# Patient Record
Sex: FEMALE | Race: BLACK OR AFRICAN AMERICAN | NOT HISPANIC OR LATINO | Employment: OTHER | URBAN - METROPOLITAN AREA
[De-identification: names, ages, dates, MRNs, and addresses within clinical notes are randomized per-mention and may not be internally consistent; named-entity substitution may affect disease eponyms.]

---

## 2023-04-25 PROBLEM — R09.89 LABILE HYPERTENSION: Status: ACTIVE | Noted: 2023-04-25

## 2023-04-25 PROBLEM — J45.909 ASTHMA (HHS-HCC): Status: ACTIVE | Noted: 2023-04-25

## 2023-04-26 ENCOUNTER — OFFICE VISIT (OUTPATIENT)
Dept: PRIMARY CARE | Facility: CLINIC | Age: 68
End: 2023-04-26
Payer: MEDICARE

## 2023-04-26 VITALS
TEMPERATURE: 97.9 F | HEART RATE: 89 BPM | DIASTOLIC BLOOD PRESSURE: 67 MMHG | SYSTOLIC BLOOD PRESSURE: 112 MMHG | WEIGHT: 293 LBS | BODY MASS INDEX: 44.3 KG/M2

## 2023-04-26 DIAGNOSIS — E55.9 VITAMIN D DEFICIENCY: ICD-10-CM

## 2023-04-26 DIAGNOSIS — Z12.39 BREAST SCREENING: ICD-10-CM

## 2023-04-26 DIAGNOSIS — I10 ESSENTIAL HYPERTENSION: ICD-10-CM

## 2023-04-26 DIAGNOSIS — J45.20 MILD INTERMITTENT ASTHMA WITHOUT COMPLICATION (HHS-HCC): ICD-10-CM

## 2023-04-26 DIAGNOSIS — E66.01 OBESITY, MORBID (MULTI): ICD-10-CM

## 2023-04-26 DIAGNOSIS — R73.03 PREDIABETES: ICD-10-CM

## 2023-04-26 DIAGNOSIS — R09.89 LABILE HYPERTENSION: Primary | ICD-10-CM

## 2023-04-26 DIAGNOSIS — Z12.31 BREAST CANCER SCREENING BY MAMMOGRAM: ICD-10-CM

## 2023-04-26 PROCEDURE — 1036F TOBACCO NON-USER: CPT | Performed by: INTERNAL MEDICINE

## 2023-04-26 PROCEDURE — 1159F MED LIST DOCD IN RCRD: CPT | Performed by: INTERNAL MEDICINE

## 2023-04-26 PROCEDURE — 99214 OFFICE O/P EST MOD 30 MIN: CPT | Performed by: INTERNAL MEDICINE

## 2023-04-26 PROCEDURE — 1160F RVW MEDS BY RX/DR IN RCRD: CPT | Performed by: INTERNAL MEDICINE

## 2023-04-26 PROCEDURE — 3078F DIAST BP <80 MM HG: CPT | Performed by: INTERNAL MEDICINE

## 2023-04-26 PROCEDURE — 3074F SYST BP LT 130 MM HG: CPT | Performed by: INTERNAL MEDICINE

## 2023-04-26 RX ORDER — ACETAMINOPHEN 500 MG
1 TABLET ORAL DAILY
COMMUNITY
Start: 2013-12-03

## 2023-04-26 RX ORDER — NICOTINE POLACRILEX 2 MG
GUM BUCCAL
COMMUNITY

## 2023-04-26 RX ORDER — BECLOMETHASONE DIPROPIONATE HFA 80 UG/1
80 AEROSOL, METERED RESPIRATORY (INHALATION)
COMMUNITY
Start: 2021-02-11 | End: 2023-10-05

## 2023-04-26 RX ORDER — ALBUTEROL SULFATE 90 UG/1
1 AEROSOL, METERED RESPIRATORY (INHALATION) EVERY 4 HOURS PRN
COMMUNITY
Start: 2013-05-15 | End: 2023-10-05

## 2023-04-26 RX ORDER — CHLORTHALIDONE 25 MG/1
1 TABLET ORAL DAILY
COMMUNITY
Start: 2014-12-29 | End: 2024-01-09 | Stop reason: SDUPTHER

## 2023-04-26 RX ORDER — BUDESONIDE AND FORMOTEROL FUMARATE DIHYDRATE 80; 4.5 UG/1; UG/1
2 AEROSOL RESPIRATORY (INHALATION)
Qty: 1 EACH | Refills: 2 | Status: SHIPPED | OUTPATIENT
Start: 2023-04-26 | End: 2023-04-28 | Stop reason: SDUPTHER

## 2023-04-26 NOTE — PROGRESS NOTES
Subjective     Patient ID: Latanya Wade is a 67 y.o. female who presents for Establish Care.  HPI  67F past pt of Dr. Dawkins here for establishment of care. She was last seen by Dr. Dawkins in January of last year for preventive care visit.     - Gained 12 pounds over the last month or so, has had significant success with weight loss in the past.     PMHx:  - HTN on chlorthalidone 25mg has missed a few doses this week, checks home BAP readings typically ranging 120-130/70s.   - Asthma - mild intermittent triggered by an allergen such as dust. No nighttime symptoms no hospitalizations.   - Breast biopsy 2010 with atypical cells elected not to pursue tamoxifen treatment.     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 Has nieces. Moved in November.    Lifestyle   - Exercises regularly, youtube, line dances, walks, resistance with weights.     ROS:  Review of systems as stated above, otherwise unremarkable.        Objective   Vitals:    04/26/23 1201 04/26/23 1230   BP: 151/71 112/67   Pulse: 89    Temp: 36.6 °C (97.9 °F)    TempSrc: Temporal    Weight: 136 kg (300 lb)           Physical Exam  General: Alert and oriented, in no apparent distress   HEENT: No conjunctival erythema, no external facial lesions   Lungs: Breathing comfortably  Skin: No evidence of skin breakdown.  Neuro: AAO x 3, answering questions appropriately, no obvious cranial nerve deficits    Assessment/Plan   Problem List Items Addressed This Visit          Respiratory    Mild intermittent asthma without complication     Well controlled, unclear indication for qvar given that it has always been mild intermittent. Will stop qvar, advised symbicort prn for now.   Notify if symptoms worsen.          Relevant Medications    budesonide-formoteroL (Symbicort) 80-4.5 mcg/actuation inhaler       Circulatory    Labile hypertension -  Primary     Repeat measurement well controlled on chlorthalidone without side effects.   Continue lifestyle modifications.         Relevant Orders    CBC and Auto Differential    Comprehensive Metabolic Panel    TSH with reflex to Free T4 if abnormal    Lipid Panel    Vitamin D 25-Hydroxy,Total       Endocrine/Metabolic    Obesity, morbid (CMS/HCC)     With difficulty losing weight despite attempts at lifestyle modifications. Exercises regularly as well.  Weight related comorbidities including hypertension, will further risk stratify with repeat blodowork and discuss in greater detail at next visit.          Other Visit Diagnoses       Breast screening        Relevant Orders    BI mammo bilateral screening tomosynthesis    Prediabetes        Reviewed lifestyle modifications, remotely detected. Dieudonne repeat    Relevant Orders    Hemoglobin A1C    Breast cancer screening by mammogram        Relevant Orders    BI mammo bilateral screening tomosynthesis    Essential hypertension        Relevant Orders    Lipid Panel    Follow Up In [facility] - PCP    Vitamin D deficiency        Relevant Orders    Vitamin D 25-Hydroxy,Total            Health Maintenance   Cancer screen  - Colonoscopy 2/22 repeat 5 years   - Mammogram 10/21 due for repeat   - Pap + HPV UTD 2019.  - Skin no indications at present.  Immunizations:   - UTD flu, shingrix, declines Prevnar, due for covid booster.   Cataracts - due for followup eye exam   Due for bone density test

## 2023-04-27 NOTE — ASSESSMENT & PLAN NOTE
Repeat measurement well controlled on chlorthalidone without side effects.   Continue lifestyle modifications.

## 2023-04-27 NOTE — ASSESSMENT & PLAN NOTE
Well controlled, unclear indication for qvar given that it has always been mild intermittent. Will stop qvar, advised symbicort prn for now.   Notify if symptoms worsen.

## 2023-04-27 NOTE — ASSESSMENT & PLAN NOTE
With difficulty losing weight despite attempts at lifestyle modifications. Exercises regularly as well.  Weight related comorbidities including hypertension, will further risk stratify with repeat blodowork and discuss in greater detail at next visit.

## 2023-04-28 DIAGNOSIS — J45.20 MILD INTERMITTENT ASTHMA WITHOUT COMPLICATION (HHS-HCC): ICD-10-CM

## 2023-04-28 RX ORDER — BUDESONIDE AND FORMOTEROL FUMARATE DIHYDRATE 80; 4.5 UG/1; UG/1
2 AEROSOL RESPIRATORY (INHALATION)
Qty: 1 EACH | Refills: 2 | Status: SHIPPED | OUTPATIENT
Start: 2023-04-28 | End: 2024-04-27

## 2023-05-03 RX ORDER — BECLOMETHASONE DIPROPIONATE HFA 80 UG/1
80 AEROSOL, METERED RESPIRATORY (INHALATION)
Qty: 3 G | Refills: 1 | OUTPATIENT
Start: 2023-05-03

## 2023-07-08 ENCOUNTER — LAB (OUTPATIENT)
Dept: LAB | Facility: LAB | Age: 68
End: 2023-07-08
Payer: MEDICARE

## 2023-07-08 DIAGNOSIS — I10 ESSENTIAL HYPERTENSION: ICD-10-CM

## 2023-07-08 DIAGNOSIS — E55.9 VITAMIN D DEFICIENCY: ICD-10-CM

## 2023-07-08 DIAGNOSIS — R73.03 PREDIABETES: ICD-10-CM

## 2023-07-08 DIAGNOSIS — R09.89 LABILE HYPERTENSION: ICD-10-CM

## 2023-07-08 LAB
ALANINE AMINOTRANSFERASE (SGPT) (U/L) IN SER/PLAS: 5 U/L (ref 7–45)
ALBUMIN (G/DL) IN SER/PLAS: 3.9 G/DL (ref 3.4–5)
ALKALINE PHOSPHATASE (U/L) IN SER/PLAS: 87 U/L (ref 33–136)
ANION GAP IN SER/PLAS: 10 MMOL/L (ref 10–20)
ASPARTATE AMINOTRANSFERASE (SGOT) (U/L) IN SER/PLAS: 14 U/L (ref 9–39)
BASOPHILS (10*3/UL) IN BLOOD BY AUTOMATED COUNT: 0.05 X10E9/L (ref 0–0.1)
BASOPHILS/100 LEUKOCYTES IN BLOOD BY AUTOMATED COUNT: 1.1 % (ref 0–2)
BILIRUBIN TOTAL (MG/DL) IN SER/PLAS: 0.7 MG/DL (ref 0–1.2)
CALCIDIOL (25 OH VITAMIN D3) (NG/ML) IN SER/PLAS: 30 NG/ML
CALCIUM (MG/DL) IN SER/PLAS: 9.5 MG/DL (ref 8.6–10.6)
CARBON DIOXIDE, TOTAL (MMOL/L) IN SER/PLAS: 33 MMOL/L (ref 21–32)
CHLORIDE (MMOL/L) IN SER/PLAS: 103 MMOL/L (ref 98–107)
CHOLESTEROL (MG/DL) IN SER/PLAS: 171 MG/DL (ref 0–199)
CHOLESTEROL IN HDL (MG/DL) IN SER/PLAS: 55.6 MG/DL
CHOLESTEROL/HDL RATIO: 3.1
CREATININE (MG/DL) IN SER/PLAS: 0.75 MG/DL (ref 0.5–1.05)
EOSINOPHILS (10*3/UL) IN BLOOD BY AUTOMATED COUNT: 0.18 X10E9/L (ref 0–0.7)
EOSINOPHILS/100 LEUKOCYTES IN BLOOD BY AUTOMATED COUNT: 4 % (ref 0–6)
ERYTHROCYTE DISTRIBUTION WIDTH (RATIO) BY AUTOMATED COUNT: 18 % (ref 11.5–14.5)
ERYTHROCYTE MEAN CORPUSCULAR HEMOGLOBIN CONCENTRATION (G/DL) BY AUTOMATED: 29.9 G/DL (ref 32–36)
ERYTHROCYTE MEAN CORPUSCULAR VOLUME (FL) BY AUTOMATED COUNT: 77 FL (ref 80–100)
ERYTHROCYTES (10*6/UL) IN BLOOD BY AUTOMATED COUNT: 5.72 X10E12/L (ref 4–5.2)
ESTIMATED AVERAGE GLUCOSE FOR HBA1C: 128 MG/DL
GFR FEMALE: 88 ML/MIN/1.73M2
GLUCOSE (MG/DL) IN SER/PLAS: 94 MG/DL (ref 74–99)
HEMATOCRIT (%) IN BLOOD BY AUTOMATED COUNT: 43.8 % (ref 36–46)
HEMOGLOBIN (G/DL) IN BLOOD: 13.1 G/DL (ref 12–16)
HEMOGLOBIN A1C/HEMOGLOBIN TOTAL IN BLOOD: 6.1 %
IMMATURE GRANULOCYTES/100 LEUKOCYTES IN BLOOD BY AUTOMATED COUNT: 0.2 % (ref 0–0.9)
LDL: 101 MG/DL (ref 0–99)
LEUKOCYTES (10*3/UL) IN BLOOD BY AUTOMATED COUNT: 4.5 X10E9/L (ref 4.4–11.3)
LYMPHOCYTES (10*3/UL) IN BLOOD BY AUTOMATED COUNT: 1.5 X10E9/L (ref 1.2–4.8)
LYMPHOCYTES/100 LEUKOCYTES IN BLOOD BY AUTOMATED COUNT: 33.2 % (ref 13–44)
MONOCYTES (10*3/UL) IN BLOOD BY AUTOMATED COUNT: 0.61 X10E9/L (ref 0.1–1)
MONOCYTES/100 LEUKOCYTES IN BLOOD BY AUTOMATED COUNT: 13.5 % (ref 2–10)
NEUTROPHILS (10*3/UL) IN BLOOD BY AUTOMATED COUNT: 2.17 X10E9/L (ref 1.2–7.7)
NEUTROPHILS/100 LEUKOCYTES IN BLOOD BY AUTOMATED COUNT: 48 % (ref 40–80)
NRBC (PER 100 WBCS) BY AUTOMATED COUNT: 0 /100 WBC (ref 0–0)
PLATELETS (10*3/UL) IN BLOOD AUTOMATED COUNT: 265 X10E9/L (ref 150–450)
POTASSIUM (MMOL/L) IN SER/PLAS: 3.8 MMOL/L (ref 3.5–5.3)
PROTEIN TOTAL: 7.4 G/DL (ref 6.4–8.2)
SODIUM (MMOL/L) IN SER/PLAS: 142 MMOL/L (ref 136–145)
THYROTROPIN (MIU/L) IN SER/PLAS BY DETECTION LIMIT <= 0.05 MIU/L: 0.72 MIU/L (ref 0.44–3.98)
TRIGLYCERIDE (MG/DL) IN SER/PLAS: 72 MG/DL (ref 0–149)
UREA NITROGEN (MG/DL) IN SER/PLAS: 19 MG/DL (ref 6–23)
VLDL: 14 MG/DL (ref 0–40)

## 2023-07-08 PROCEDURE — 83036 HEMOGLOBIN GLYCOSYLATED A1C: CPT

## 2023-07-08 PROCEDURE — 36415 COLL VENOUS BLD VENIPUNCTURE: CPT

## 2023-07-08 PROCEDURE — 82306 VITAMIN D 25 HYDROXY: CPT

## 2023-07-08 PROCEDURE — 85025 COMPLETE CBC W/AUTO DIFF WBC: CPT

## 2023-07-08 PROCEDURE — 80053 COMPREHEN METABOLIC PANEL: CPT

## 2023-07-08 PROCEDURE — 80061 LIPID PANEL: CPT

## 2023-07-08 PROCEDURE — 84443 ASSAY THYROID STIM HORMONE: CPT

## 2023-07-13 ENCOUNTER — OFFICE VISIT (OUTPATIENT)
Dept: PRIMARY CARE | Facility: CLINIC | Age: 68
End: 2023-07-13
Payer: MEDICARE

## 2023-07-13 VITALS
BODY MASS INDEX: 45.35 KG/M2 | HEART RATE: 81 BPM | WEIGHT: 293 LBS | TEMPERATURE: 98.1 F | SYSTOLIC BLOOD PRESSURE: 147 MMHG | DIASTOLIC BLOOD PRESSURE: 71 MMHG

## 2023-07-13 DIAGNOSIS — Z78.0 ASYMPTOMATIC MENOPAUSAL STATE: Primary | ICD-10-CM

## 2023-07-13 DIAGNOSIS — R73.03 PREDIABETES: ICD-10-CM

## 2023-07-13 DIAGNOSIS — H26.9 CATARACT OF BOTH EYES, UNSPECIFIED CATARACT TYPE: ICD-10-CM

## 2023-07-13 DIAGNOSIS — R09.89 LABILE HYPERTENSION: ICD-10-CM

## 2023-07-13 DIAGNOSIS — I10 ESSENTIAL HYPERTENSION: ICD-10-CM

## 2023-07-13 DIAGNOSIS — B35.3 TINEA PEDIS OF BOTH FEET: ICD-10-CM

## 2023-07-13 DIAGNOSIS — Z00.00 ROUTINE GENERAL MEDICAL EXAMINATION AT HEALTH CARE FACILITY: ICD-10-CM

## 2023-07-13 DIAGNOSIS — E66.01 OBESITY, MORBID (MULTI): ICD-10-CM

## 2023-07-13 PROBLEM — M54.31 SCIATICA OF RIGHT SIDE: Status: ACTIVE | Noted: 2023-07-13

## 2023-07-13 PROCEDURE — 1170F FXNL STATUS ASSESSED: CPT | Performed by: INTERNAL MEDICINE

## 2023-07-13 PROCEDURE — 3077F SYST BP >= 140 MM HG: CPT | Performed by: INTERNAL MEDICINE

## 2023-07-13 PROCEDURE — 3078F DIAST BP <80 MM HG: CPT | Performed by: INTERNAL MEDICINE

## 2023-07-13 PROCEDURE — 99214 OFFICE O/P EST MOD 30 MIN: CPT | Performed by: INTERNAL MEDICINE

## 2023-07-13 PROCEDURE — 1036F TOBACCO NON-USER: CPT | Performed by: INTERNAL MEDICINE

## 2023-07-13 PROCEDURE — G0439 PPPS, SUBSEQ VISIT: HCPCS | Performed by: INTERNAL MEDICINE

## 2023-07-13 PROCEDURE — 1126F AMNT PAIN NOTED NONE PRSNT: CPT | Performed by: INTERNAL MEDICINE

## 2023-07-13 PROCEDURE — 1160F RVW MEDS BY RX/DR IN RCRD: CPT | Performed by: INTERNAL MEDICINE

## 2023-07-13 PROCEDURE — 1159F MED LIST DOCD IN RCRD: CPT | Performed by: INTERNAL MEDICINE

## 2023-07-13 RX ORDER — CLOTRIMAZOLE 1 %
CREAM (GRAM) TOPICAL 2 TIMES DAILY
Qty: 30 G | Refills: 0 | Status: SHIPPED | OUTPATIENT
Start: 2023-07-13 | End: 2023-08-12

## 2023-07-13 ASSESSMENT — PATIENT HEALTH QUESTIONNAIRE - PHQ9
1. LITTLE INTEREST OR PLEASURE IN DOING THINGS: NOT AT ALL
SUM OF ALL RESPONSES TO PHQ9 QUESTIONS 1 & 2: 0
2. FEELING DOWN, DEPRESSED OR HOPELESS: NOT AT ALL
2. FEELING DOWN, DEPRESSED OR HOPELESS: NOT AT ALL
1. LITTLE INTEREST OR PLEASURE IN DOING THINGS: NOT AT ALL
SUM OF ALL RESPONSES TO PHQ9 QUESTIONS 1 AND 2: 0

## 2023-07-13 ASSESSMENT — ACTIVITIES OF DAILY LIVING (ADL)
GROCERY_SHOPPING: INDEPENDENT
MANAGING_FINANCES: INDEPENDENT
DRESSING: INDEPENDENT
BATHING: INDEPENDENT
DOING_HOUSEWORK: INDEPENDENT
TAKING_MEDICATION: INDEPENDENT

## 2023-07-13 NOTE — ASSESSMENT & PLAN NOTE
Has had success with lifestyle modifications, not interested in additional medical management strategies. Encouragement provided.

## 2023-07-13 NOTE — PROGRESS NOTES
Subjective     Patient ID: Latanya Wade is a 68 y.o. female who presents for No chief complaint on file., annual wellness visit and followup of chronic conditions.   HPI  68F here for AWV and followup of chronic conditions, last seen in April for establishment of care. This is her first AWV, though has been over a year since starting Medicare.     6/23: mammo good.   7/23: labs done prior notable for prediabetes.     - Numbness on plantar surface of right toe started last August no clear precipitant. Still has sensation but reduced as compared with the other side. Etiology as of yet undetermined. Has had EMG for this in the past, no weakness.     PMHx:  - HTN on chlorthalidone 25mg did not take her pill yesterday, home BP readings 120s/70s.   - Asthma - mild intermittent triggered by an allergen such as dust. No nighttime symptoms no hospitalizations, stopped qvar at last visit for symbicort prn. No issues since.   - Breast biopsy 2010 with atypical cells elected not to pursue tamoxifen treatment.   - Class III obesity - difficulty losing weight interested in further management, not interested in medical management.   - Prediabetes most recent A1C 6.1%   - Sciatica and intermittent stiffness in knees and hips - does flexibility exercises, no alarm symptoms.   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 Has nieces. Moved in November.    Lifestyle - started at 381 now down to 307, has a history of losing 100 pounds.   - Diet - has good and bad days, not planning and not having food in the house are triggers for worsening diet, knows what to do.   - Exercises regularly, youtube, line dances, walks, resistance with weights.   - Sleep  - no trouble falling asleep, wakes up a few times a night and then falls asleep again. Side sleeping results in numbness in  fingers.  Does not know if she stops breathing or snoring. Not overly tired the next day, Gets 6 hours a night and feels refreshed. Mind is on overdrive.      No care team member to display      Review of Systems:   General: No constitutional symptoms, significant changes in weight  HEENT: No headaches, changes in vision or hearing, no sinus or dental issues   Cardiac: No chest pain, palpitations, dyspnea on exertion   Lungs: No cough, wheezing, shortness of breath   Abdomen: No abdominal pain, nausea/vomiting, diarrhea or constipation   : No urinary complaints   Musculoskeletal: as described   Heme: No bleeding or thrombosis issues   Lymph: No swollen lymph glands   Skin: No rashes or lesions   Psych: No reported anxiety or depression     Objective       Current Outpatient Medications:     albuterol 90 mcg/actuation inhaler, Inhale 1 puff every 4 hours if needed., Disp: , Rfl:     biotin 1 mg capsule, Take by mouth., Disp: , Rfl:     budesonide-formoteroL (Symbicort) 80-4.5 mcg/actuation inhaler, Inhale 2 puffs 2 times a day. Rinse mouth with water after use to reduce aftertaste and incidence of candidiasis. Do not swallow., Disp: 1 each, Rfl: 2    CALCIUM CITRATE ORAL, Take 1 tablet by mouth once daily., Disp: , Rfl:     chlorthalidone (Hygroton) 25 mg tablet, Take 1 tablet (25 mg) by mouth once daily., Disp: , Rfl:     cholecalciferol (Vitamin D-3) 50 mcg (2,000 unit) capsule, Take 1 capsule (50 mcg) by mouth once daily., Disp: , Rfl:     clotrimazole (Lotrimin) 1 % cream, Apply topically 2 times a day. apply to affected area, Disp: 30 g, Rfl: 0    Qvar RediHaler 80 mcg/actuation inhaler, Inhale 1 Inhalation 2 times a day., Disp: , Rfl:       /71   Pulse 81   Temp 36.7 °C (98.1 °F)   Wt 139 kg (307 lb 2 oz)   BMI 45.35 kg/m²       Physical Examination:   General: Awake, alert, appears stated age   Head/eyes/ears: NCAT, EOMI, PERRL, TM WNL, no cerumen  Throat: moist mucus membranes, no pharyngeal  erythema  Neck: Supple, nontender, no lymphadenopathy, thyroid exam unremarkable   Breast exam: No palpable lumps, no discharge, no noted axillary lymphadenopathy. Chaperone offered and declined  Heart: RRR, no murmurs, rubs or gallops  Lungs: CTA bilaterally, no rhonchi rales or wheezes   Abdomen: Soft, NT/ND  Extremities: No edema, 2+ DP pulses   Skin: Tinea pedis appreciated   Neuro: AAO x 3, no FND, gait unremarkable    Assessment/Plan   Problem List Items Addressed This Visit       Labile hypertension     Regular home blood pressure readings remain wnl, elevated today. Will recheck at next visit.          Obesity, morbid (CMS/HCC)     Has had success with lifestyle modifications, not interested in additional medical management strategies. Encouragement provided.          Prediabetes     Lifestyle modifications reviewed.          Cataracts, bilateral     Pending surgery with optho in August.          Other Visit Diagnoses       Asymptomatic menopausal state    -  Primary    Relevant Orders    XR DEXA bone density    Essential hypertension        Relevant Orders    Referral to Physical Therapy    Follow Up In [facility] - PCP - Established    Tinea pedis of both feet        Relevant Medications    clotrimazole (Lotrimin) 1 % cream    Routine general medical examination at health care facility            Sleep maintenance insomnia   Unclear etiology, lower index of suspicion for sleep apnea given that she does feel rested and no profound fatigue throughout the day. Will continue to work on sleep hygiene, advised trial of magnesium and melatonin prior to bed. Will reassess at next visit.     Toe numbness  Workup including EMG performed, has been discussed consideration for MRI to explore lumbar etiology, though precontemplative, followup with Dr. Ashraf.     Adult Health Examination  Age appropriate screening performed   Healthy lifestyle reviewed.   Depression screen   No additional pertinent family  history or toxic habits   No high risk sexual behavior,   Cancer screening:   - Colonoscopy 2/22 repeat 5 years   - Mammogram 4/23   - Pap smear 2019 upcoming appt scheduled with Dr. Thomas  until September   - Skin cancer prevention strategies reviewed   Immunizations Flu shot recommended in October, recommend Prevnar and covid booster   Dental and visual examinations   Discussed adequate Vitamin D intake   DEXA due and ordered

## 2023-07-13 NOTE — PATIENT INSTRUCTIONS
It was a pleasure to see you today! Here is a list of things we have discussed and to follow up on:    Try magnesium threonate or glycinate 400mg 30 minutes before bed. If you use melatonin no more than 1-3mg MAX.   Followup with Dr. Ashraf regarding the numbness.   Consider establishing a living will and healthcare power of .   Tdap vaccination at the pharmacy, call to schedule your pneumonia shot   COVID bivalent booster at the pharmacy.   Bone density test - please call [phone] or stop by the radiology department on the 1st floor to have this scheduled.   Lotrimin between the toes.   Followup with me in 6 months or as needed

## 2023-08-28 PROBLEM — G57.10 MERALGIA PARAESTHETICA: Status: ACTIVE | Noted: 2023-08-28

## 2023-08-28 PROBLEM — J45.909 ASTHMA (HHS-HCC): Status: ACTIVE | Noted: 2023-08-28

## 2023-08-28 PROBLEM — I87.323 CHRONIC VENOUS HYPERTENSION WITH INFLAMMATION INVOLVING BOTH SIDES: Status: ACTIVE | Noted: 2023-08-28

## 2023-08-28 PROBLEM — E55.9 VITAMIN D DEFICIENCY: Status: ACTIVE | Noted: 2023-08-28

## 2023-08-28 PROBLEM — R29.2 HYPERREFLEXIA: Status: ACTIVE | Noted: 2023-08-28

## 2023-08-28 PROBLEM — R20.0 NUMBNESS OF RIGHT FOOT: Status: ACTIVE | Noted: 2023-08-28

## 2023-08-28 PROBLEM — M19.012 ARTHRITIS OF LEFT SHOULDER REGION: Status: ACTIVE | Noted: 2023-08-28

## 2023-08-28 PROBLEM — R20.0 NUMBNESS OF TOES: Status: ACTIVE | Noted: 2023-08-28

## 2023-08-28 PROBLEM — M54.17 LUMBOSACRAL RADICULOPATHY AT S1: Status: ACTIVE | Noted: 2023-08-28

## 2023-08-28 RX ORDER — PREDNISOLONE ACETATE 10 MG/ML
SUSPENSION/ DROPS OPHTHALMIC
COMMUNITY
Start: 2023-06-27

## 2023-08-28 RX ORDER — FLAXSEED OIL 1000 MG
CAPSULE ORAL
COMMUNITY
Start: 2013-12-03

## 2023-08-28 RX ORDER — MOXIFLOXACIN 5 MG/ML
SOLUTION/ DROPS OPHTHALMIC
COMMUNITY
Start: 2023-06-27

## 2023-08-28 RX ORDER — IBUPROFEN 100 MG/5ML
1 SUSPENSION, ORAL (FINAL DOSE FORM) ORAL DAILY
COMMUNITY
Start: 2020-10-13

## 2023-08-28 RX ORDER — BROMFENAC SODIUM 0.7 MG/ML
SOLUTION/ DROPS OPHTHALMIC
COMMUNITY
Start: 2023-06-27

## 2023-08-28 RX ORDER — SODIUM PICOSULFATE, MAGNESIUM OXIDE, AND ANHYDROUS CITRIC ACID 10; 3.5; 12 MG/160ML; G/160ML; G/160ML
LIQUID ORAL
COMMUNITY
Start: 2022-02-07

## 2023-10-05 ENCOUNTER — OFFICE VISIT (OUTPATIENT)
Dept: OBSTETRICS AND GYNECOLOGY | Facility: CLINIC | Age: 68
End: 2023-10-05
Payer: MEDICARE

## 2023-10-05 VITALS
DIASTOLIC BLOOD PRESSURE: 62 MMHG | HEIGHT: 69 IN | WEIGHT: 293 LBS | BODY MASS INDEX: 43.4 KG/M2 | SYSTOLIC BLOOD PRESSURE: 140 MMHG

## 2023-10-05 DIAGNOSIS — Z12.4 CERVICAL CANCER SCREENING: ICD-10-CM

## 2023-10-05 DIAGNOSIS — Z01.419 ENCOUNTER FOR WELL WOMAN EXAM: Primary | ICD-10-CM

## 2023-10-05 PROCEDURE — 1159F MED LIST DOCD IN RCRD: CPT | Performed by: OBSTETRICS & GYNECOLOGY

## 2023-10-05 PROCEDURE — 1036F TOBACCO NON-USER: CPT | Performed by: OBSTETRICS & GYNECOLOGY

## 2023-10-05 PROCEDURE — 1126F AMNT PAIN NOTED NONE PRSNT: CPT | Performed by: OBSTETRICS & GYNECOLOGY

## 2023-10-05 PROCEDURE — 99397 PER PM REEVAL EST PAT 65+ YR: CPT | Performed by: OBSTETRICS & GYNECOLOGY

## 2023-10-05 PROCEDURE — 3077F SYST BP >= 140 MM HG: CPT | Performed by: OBSTETRICS & GYNECOLOGY

## 2023-10-05 PROCEDURE — 1160F RVW MEDS BY RX/DR IN RCRD: CPT | Performed by: OBSTETRICS & GYNECOLOGY

## 2023-10-05 PROCEDURE — 3078F DIAST BP <80 MM HG: CPT | Performed by: OBSTETRICS & GYNECOLOGY

## 2023-10-05 ASSESSMENT — PAIN SCALES - GENERAL: PAINLEVEL: 0-NO PAIN

## 2023-10-05 NOTE — PROGRESS NOTES
"Latanya Wade is a 68 y.o. No obstetric history on file. here for well woman exam.    HPI:   Concerns: no GYN concerns   Exercise: regular - walking, line dancing       GynHx:  Cycles: menopausal   Sexually active: not currently   Contraception: n/a   No LMP recorded (lmp unknown).  / menopause      OB History    No obstetric history on file.       Past med hx and past surg hx reviewed and notable for: cataract surgery     Objective   /62   Ht 1.753 m (5' 9\")   Wt 140 kg (308 lb)   LMP  (LMP Unknown) Comment: over 10 years ago  BMI 45.48 kg/m²     Physical Exam  Constitutional:       Appearance: Normal appearance.   HENT:      Head: Normocephalic and atraumatic.   Chest:   Breasts:     Right: Normal.      Left: Normal.   Abdominal:      General: Abdomen is flat.      Palpations: Abdomen is soft.      Tenderness: There is no abdominal tenderness.   Genitourinary:     General: Normal vulva.      Vagina: Normal.      Cervix: Normal.      Uterus: Normal.       Adnexa: Right adnexa normal and left adnexa normal.   Skin:     General: Skin is warm and dry.   Neurological:      Mental Status: She is alert and oriented to person, place, and time.   Psychiatric:         Mood and Affect: Mood normal.          Assessment and Plan:  Routine Well Woman Exam Today.   Discussed diet and exercise and routine health screening.   Pap: pap done today, last pap 2019 wnl            "

## 2023-10-06 PROCEDURE — 88175 CYTOPATH C/V AUTO FLUID REDO: CPT

## 2023-10-06 PROCEDURE — 87624 HPV HI-RISK TYP POOLED RSLT: CPT

## 2023-10-10 ENCOUNTER — TREATMENT (OUTPATIENT)
Dept: PHYSICAL THERAPY | Facility: CLINIC | Age: 68
End: 2023-10-10
Payer: MEDICARE

## 2023-10-10 DIAGNOSIS — M54.31 SCIATICA OF RIGHT SIDE: Primary | ICD-10-CM

## 2023-10-10 PROCEDURE — 97110 THERAPEUTIC EXERCISES: CPT | Mod: GP

## 2023-10-10 NOTE — PROGRESS NOTES
"Physical Therapy    Physical Therapy Treatment    Patient Name: Latanya Wade  MRN: [id number]  Today's Date: 10/10/2023  Time Calculation  Start Time: 1120  Stop Time: 1200  Time Calculation (min): 40 min  Visit: 3      Assessment:  PT Assessment  Assessment Comment: Scottie responded well to stretching and core / hip strenghtening well this sessoin. Continue to progress as tolerated.    Plan:  OP PT Plan  Treatment/Interventions: Cryotherapy, Dry needling, Education/ Instruction, Electrical stimulation, Hot pack, Manual therapy, Neuromuscular re-education, Therapeutic activities, Therapeutic exercises  PT Plan: Skilled PT  PT Frequency: 1 time per week  Duration: 8 weeks  Onset Date: 03/11/23  Number of Treatments Authorized: Med Nec  Rehab Potential: Excellent  Plan of Care Agreement: Patient    Current Problem  1. Sciatica of right side            Subjective   General  General Comment: Patient reports she does not have any pain currently. States symptoms are intermittent.  Precautions   None     Pain   0/10    Objective   Cognition WNL       Treatments:  Therapeutic Exercise  Therapeutic Exercise Performed: Yes  Therapeutic Exercise Activity 1: nustep x 8 minutes  Therapeutic Exercise Activity 2: standing hamstring stretch 3 x 30\"  Therapeutic Exercise Activity 3: side lying IT band stretch 3 x 30\"  Therapeutic Exercise Activity 4: swiss ball DKTC 20 x 3\"  Therapeutic Exercise Activity 5: swiss ball bridge 2 x 10 x 3\"  Therapeutic Exercise Activity 6: piriformis stretch 3 x 30\"  Therapeutic Exercise Activity 7: side lying hip abduction 2 x 10      Goals:  Encounter Problems       Encounter Problems (Active)       PT Problem       PT Goal 1       Start:  10/10/23                  "

## 2023-10-16 ENCOUNTER — APPOINTMENT (OUTPATIENT)
Dept: PHYSICAL THERAPY | Facility: CLINIC | Age: 68
End: 2023-10-16
Payer: MEDICARE

## 2023-10-20 ENCOUNTER — TELEPHONE (OUTPATIENT)
Dept: PRIMARY CARE | Facility: CLINIC | Age: 68
End: 2023-10-20
Payer: MEDICARE

## 2023-10-20 NOTE — TELEPHONE ENCOUNTER
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 She was last seen in June. Does she need another appointment or can she send the paper work in to be completed  Please advise

## 2023-10-24 ENCOUNTER — APPOINTMENT (OUTPATIENT)
Dept: PHYSICAL THERAPY | Facility: CLINIC | Age: 68
End: 2023-10-24
Payer: MEDICARE

## 2023-10-24 LAB
CYTOLOGY CMNT CVX/VAG CYTO-IMP: NORMAL
HPV HR GENOTYPES PNL CVX NAA+PROBE: NEGATIVE
HPV HR GENOTYPES PNL CVX NAA+PROBE: NEGATIVE
HPV16 DNA SPEC QL NAA+PROBE: NEGATIVE
HPV18 DNA SPEC QL NAA+PROBE: NEGATIVE
LAB AP HPV GENOTYPE QUESTION: YES
LAB AP HPV HR: NORMAL
LABORATORY COMMENT REPORT: NORMAL
MENSTRUAL HX REPORTED: NORMAL
PATH REPORT.TOTAL CANCER: NORMAL

## 2023-10-31 ENCOUNTER — TREATMENT (OUTPATIENT)
Dept: PHYSICAL THERAPY | Facility: CLINIC | Age: 68
End: 2023-10-31
Payer: MEDICARE

## 2023-10-31 DIAGNOSIS — M54.31 SCIATICA OF RIGHT SIDE: Primary | ICD-10-CM

## 2023-10-31 PROCEDURE — 97110 THERAPEUTIC EXERCISES: CPT | Mod: GP

## 2023-10-31 NOTE — PROGRESS NOTES
"Physical Therapy    Physical Therapy Treatment    Patient Name: Latanya Wade  MRN: [id number]  Today's Date: 10/31/2023  Time Calculation  Start Time: 1615  Stop Time: 1645  Time Calculation (min): 30 min  Visit: 4    Assessment:  PT Assessment  Assessment Comment: Scottie responded well to stretching and core / hip strenghtening well this sessoin. Continue to progress as tolerated.    Plan:  OP PT Plan  Treatment/Interventions: Cryotherapy, Dry needling, Education/ Instruction, Electrical stimulation, Hot pack, Manual therapy, Neuromuscular re-education, Therapeutic activities, Therapeutic exercises  PT Plan: Skilled PT  PT Frequency: 1 time per week  Duration: 8 weeks  Onset Date: 03/11/23  Number of Treatments Authorized: Med Nec  Rehab Potential: Excellent  Plan of Care Agreement: Patient    Current Problem  1. Sciatica of right side            Subjective   General  General Comment: Patient reports right sided sciatica.    Objective   Cognition   WNL    Treatments:  Therapeutic Exercise  Therapeutic Exercise Performed: Yes  Therapeutic Exercise Activity 1: nustep x 10 minutes  Therapeutic Exercise Activity 2: calf stretch 3 x 30\"  Therapeutic Exercise Activity 3: hamstring stretch 3 x 30\"  Therapeutic Exercise Activity 4: steamboats (blue) x 20 each way  Therapeutic Exercise Activity 5: squats with trx straps x 20    Goals:  Active       PT Problem       We are continuing the therapy plan of care and goals that were documented in the legacy EMR.  Please refer to the PT (or OT) plan of care in the EMR for treatment plan and goals.        Start:  10/10/23    Expected End:  01/08/24               "

## 2024-01-09 DIAGNOSIS — R09.89 LABILE HYPERTENSION: Primary | ICD-10-CM

## 2024-01-09 RX ORDER — CHLORTHALIDONE 25 MG/1
25 TABLET ORAL DAILY
Qty: 90 TABLET | Refills: 1 | Status: SHIPPED | OUTPATIENT
Start: 2024-01-09

## 2024-01-30 ENCOUNTER — DOCUMENTATION (OUTPATIENT)
Dept: PHYSICAL THERAPY | Facility: CLINIC | Age: 69
End: 2024-01-30
Payer: MEDICARE

## 2024-01-30 NOTE — PROGRESS NOTES
Physical Therapy    Discharge Summary    Name: Latanya Wade  MRN: [id number]  : 1955  Date: 24    Discharge Summary: PT    Discharge Information: Date of discharge 24, Date of last visit 10/31/23, Date of evaluation 10/11/23, Number of attended visits 4, Referred by Katiana Santos DO, and Referred for right sided sciatica    Therapy Summary: Plan of care consisted of stretching and LE strengthening.     Discharge Status: Discharged from physical therapy      Rehab Discharge Reason: Progress plateaued; further improvement possible

## 2024-07-11 DIAGNOSIS — R09.89 LABILE HYPERTENSION: ICD-10-CM

## 2024-07-11 RX ORDER — CHLORTHALIDONE 25 MG/1
25 TABLET ORAL DAILY
Qty: 90 TABLET | Refills: 1 | Status: SHIPPED | OUTPATIENT
Start: 2024-07-11

## 2024-09-03 ENCOUNTER — APPOINTMENT (OUTPATIENT)
Dept: PRIMARY CARE | Facility: CLINIC | Age: 69
End: 2024-09-03
Payer: MEDICARE

## 2024-09-03 ENCOUNTER — LAB (OUTPATIENT)
Dept: LAB | Facility: LAB | Age: 69
End: 2024-09-03
Payer: MEDICARE

## 2024-09-03 VITALS
SYSTOLIC BLOOD PRESSURE: 118 MMHG | BODY MASS INDEX: 45.99 KG/M2 | HEIGHT: 67 IN | DIASTOLIC BLOOD PRESSURE: 73 MMHG | HEART RATE: 61 BPM | TEMPERATURE: 97 F | WEIGHT: 293 LBS

## 2024-09-03 DIAGNOSIS — M54.17 LUMBOSACRAL RADICULOPATHY AT S1: ICD-10-CM

## 2024-09-03 DIAGNOSIS — E55.9 VITAMIN D DEFICIENCY: ICD-10-CM

## 2024-09-03 DIAGNOSIS — R09.89 LABILE HYPERTENSION: ICD-10-CM

## 2024-09-03 DIAGNOSIS — F51.01 PRIMARY INSOMNIA: ICD-10-CM

## 2024-09-03 DIAGNOSIS — E78.5 DYSLIPIDEMIA: ICD-10-CM

## 2024-09-03 DIAGNOSIS — M54.17 LUMBOSACRAL RADICULOPATHY AT L5: ICD-10-CM

## 2024-09-03 DIAGNOSIS — Z12.31 SCREENING MAMMOGRAM FOR BREAST CANCER: ICD-10-CM

## 2024-09-03 DIAGNOSIS — R20.0 NUMBNESS OF TOES: ICD-10-CM

## 2024-09-03 DIAGNOSIS — E66.01 CLASS 3 OBESITY (MULTI): ICD-10-CM

## 2024-09-03 DIAGNOSIS — Z00.00 ENCOUNTER FOR PREVENTATIVE ADULT HEALTH CARE EXAMINATION: ICD-10-CM

## 2024-09-03 DIAGNOSIS — R73.03 PREDIABETES: ICD-10-CM

## 2024-09-03 DIAGNOSIS — Z00.00 ROUTINE GENERAL MEDICAL EXAMINATION AT HEALTH CARE FACILITY: Primary | ICD-10-CM

## 2024-09-03 DIAGNOSIS — J45.20 MILD INTERMITTENT ASTHMA WITHOUT COMPLICATION (HHS-HCC): ICD-10-CM

## 2024-09-03 PROBLEM — E66.813 CLASS 3 OBESITY: Status: ACTIVE | Noted: 2023-04-26

## 2024-09-03 LAB
25(OH)D3 SERPL-MCNC: 36 NG/ML (ref 30–100)
ALBUMIN SERPL BCP-MCNC: 4.1 G/DL (ref 3.4–5)
ALP SERPL-CCNC: 82 U/L (ref 33–136)
ALT SERPL W P-5'-P-CCNC: 9 U/L (ref 7–45)
ANION GAP SERPL CALC-SCNC: 15 MMOL/L (ref 10–20)
AST SERPL W P-5'-P-CCNC: 18 U/L (ref 9–39)
BASOPHILS # BLD AUTO: 0.05 X10*3/UL (ref 0–0.1)
BASOPHILS NFR BLD AUTO: 1 %
BILIRUB SERPL-MCNC: 0.6 MG/DL (ref 0–1.2)
BUN SERPL-MCNC: 23 MG/DL (ref 6–23)
CALCIUM SERPL-MCNC: 9.3 MG/DL (ref 8.6–10.6)
CHLORIDE SERPL-SCNC: 99 MMOL/L (ref 98–107)
CO2 SERPL-SCNC: 32 MMOL/L (ref 21–32)
CREAT SERPL-MCNC: 0.69 MG/DL (ref 0.5–1.05)
CREAT UR-MCNC: 85.3 MG/DL (ref 20–320)
EGFRCR SERPLBLD CKD-EPI 2021: >90 ML/MIN/1.73M*2
EOSINOPHIL # BLD AUTO: 0.19 X10*3/UL (ref 0–0.7)
EOSINOPHIL NFR BLD AUTO: 3.9 %
ERYTHROCYTE [DISTWIDTH] IN BLOOD BY AUTOMATED COUNT: 17.2 % (ref 11.5–14.5)
EST. AVERAGE GLUCOSE BLD GHB EST-MCNC: 128 MG/DL
GLUCOSE SERPL-MCNC: 89 MG/DL (ref 74–99)
HBA1C MFR BLD: 6.1 %
HCT VFR BLD AUTO: 41.6 % (ref 36–46)
HGB BLD-MCNC: 12.9 G/DL (ref 12–16)
IMM GRANULOCYTES # BLD AUTO: 0.01 X10*3/UL (ref 0–0.7)
IMM GRANULOCYTES NFR BLD AUTO: 0.2 % (ref 0–0.9)
LYMPHOCYTES # BLD AUTO: 1.25 X10*3/UL (ref 1.2–4.8)
LYMPHOCYTES NFR BLD AUTO: 25.7 %
MCH RBC QN AUTO: 23.2 PG (ref 26–34)
MCHC RBC AUTO-ENTMCNC: 31 G/DL (ref 32–36)
MCV RBC AUTO: 75 FL (ref 80–100)
MICROALBUMIN UR-MCNC: 19.4 MG/L
MICROALBUMIN/CREAT UR: 22.7 UG/MG CREAT
MONOCYTES # BLD AUTO: 0.58 X10*3/UL (ref 0.1–1)
MONOCYTES NFR BLD AUTO: 11.9 %
NEUTROPHILS # BLD AUTO: 2.79 X10*3/UL (ref 1.2–7.7)
NEUTROPHILS NFR BLD AUTO: 57.3 %
NRBC BLD-RTO: 0 /100 WBCS (ref 0–0)
PLATELET # BLD AUTO: 263 X10*3/UL (ref 150–450)
POTASSIUM SERPL-SCNC: 3.8 MMOL/L (ref 3.5–5.3)
PROT SERPL-MCNC: 7.4 G/DL (ref 6.4–8.2)
RBC # BLD AUTO: 5.56 X10*6/UL (ref 4–5.2)
SODIUM SERPL-SCNC: 142 MMOL/L (ref 136–145)
TSH SERPL-ACNC: 0.61 MIU/L (ref 0.44–3.98)
WBC # BLD AUTO: 4.9 X10*3/UL (ref 4.4–11.3)

## 2024-09-03 PROCEDURE — 99214 OFFICE O/P EST MOD 30 MIN: CPT | Performed by: INTERNAL MEDICINE

## 2024-09-03 PROCEDURE — 3078F DIAST BP <80 MM HG: CPT | Performed by: INTERNAL MEDICINE

## 2024-09-03 PROCEDURE — 84443 ASSAY THYROID STIM HORMONE: CPT

## 2024-09-03 PROCEDURE — G0439 PPPS, SUBSEQ VISIT: HCPCS | Performed by: INTERNAL MEDICINE

## 2024-09-03 PROCEDURE — 1170F FXNL STATUS ASSESSED: CPT | Performed by: INTERNAL MEDICINE

## 2024-09-03 PROCEDURE — 1036F TOBACCO NON-USER: CPT | Performed by: INTERNAL MEDICINE

## 2024-09-03 PROCEDURE — 82306 VITAMIN D 25 HYDROXY: CPT

## 2024-09-03 PROCEDURE — 1159F MED LIST DOCD IN RCRD: CPT | Performed by: INTERNAL MEDICINE

## 2024-09-03 PROCEDURE — 3008F BODY MASS INDEX DOCD: CPT | Performed by: INTERNAL MEDICINE

## 2024-09-03 PROCEDURE — 83036 HEMOGLOBIN GLYCOSYLATED A1C: CPT

## 2024-09-03 PROCEDURE — 85025 COMPLETE CBC W/AUTO DIFF WBC: CPT

## 2024-09-03 PROCEDURE — 1160F RVW MEDS BY RX/DR IN RCRD: CPT | Performed by: INTERNAL MEDICINE

## 2024-09-03 PROCEDURE — 36415 COLL VENOUS BLD VENIPUNCTURE: CPT

## 2024-09-03 PROCEDURE — 1125F AMNT PAIN NOTED PAIN PRSNT: CPT | Performed by: INTERNAL MEDICINE

## 2024-09-03 PROCEDURE — 82043 UR ALBUMIN QUANTITATIVE: CPT

## 2024-09-03 PROCEDURE — 3074F SYST BP LT 130 MM HG: CPT | Performed by: INTERNAL MEDICINE

## 2024-09-03 PROCEDURE — 1123F ACP DISCUSS/DSCN MKR DOCD: CPT | Performed by: INTERNAL MEDICINE

## 2024-09-03 PROCEDURE — 1158F ADVNC CARE PLAN TLK DOCD: CPT | Performed by: INTERNAL MEDICINE

## 2024-09-03 PROCEDURE — 82570 ASSAY OF URINE CREATININE: CPT

## 2024-09-03 PROCEDURE — 80053 COMPREHEN METABOLIC PANEL: CPT

## 2024-09-03 ASSESSMENT — ACTIVITIES OF DAILY LIVING (ADL)
TAKING_MEDICATION: INDEPENDENT
DRESSING: INDEPENDENT
DOING_HOUSEWORK: INDEPENDENT
BATHING: INDEPENDENT
GROCERY_SHOPPING: INDEPENDENT
MANAGING_FINANCES: INDEPENDENT

## 2024-09-03 ASSESSMENT — PATIENT HEALTH QUESTIONNAIRE - PHQ9
2. FEELING DOWN, DEPRESSED OR HOPELESS: NOT AT ALL
1. LITTLE INTEREST OR PLEASURE IN DOING THINGS: NOT AT ALL
SUM OF ALL RESPONSES TO PHQ9 QUESTIONS 1 AND 2: 0

## 2024-09-03 ASSESSMENT — ENCOUNTER SYMPTOMS
DEPRESSION: 0
LOSS OF SENSATION IN FEET: 0
OCCASIONAL FEELINGS OF UNSTEADINESS: 0

## 2024-09-03 ASSESSMENT — PAIN SCALES - GENERAL: PAINLEVEL: 3

## 2024-09-03 NOTE — PATIENT INSTRUCTIONS
Latanya  I have ordered blood and/or urine tests for you to do today. The lab can be found on this floor (2nd floor) next to the pharmacy across from the elevators.    Physical therapy referral   In lab sleep study - for the [place] location please call [phone].   Remove the rugs!   Mammogram - Call [phone] to have this scheduled.    Bone density test - please call [phone] or stop by the radiology department on the 1st floor to have this scheduled.   Vaccinations recommended   Pneumonia vaccine - Prevnar 20   High dose flu shot   COVID booster   RSV vaccine   Tetanus shot   To further assess your cardiovascular risk, I have ordered a coronary artery calcium score. Stop by the radiology department on the first floor to schedule this or call [phone].     Followup 1 year or as needed

## 2024-09-03 NOTE — ASSESSMENT & PLAN NOTE
Adhering to healthy lifestyle, not interested in medical or surgical options for weight loss   Has had significant success in the past, gradual trend towards continued weight loss   Encouragement provided.

## 2024-09-03 NOTE — PROGRESS NOTES
Subjective     Patient ID: Latanya Wade is a 69 y.o. female who presents for Medicare Annual Wellness Visit Subsequent and Follow-up, annual wellness visit and followup of chronic conditions.   HPI    69-year-old female here for annual wellness visit and follow-up of chronic conditions, last seen last year    PMHx:  - Right toe numbness   Of uncertain etiology has had EMG performed followed by Dr. Ashraf remotely believed to be related to lumbar radiculopathy  - HTN - on chlorthalidone 25mg   - Asthma - mild intermittent triggered by an allergen such as dust. No nighttime symptoms no hospitalizations, stopped qvar at last visit for symbicort prn. No issues since, no inhaler use, no rescue use.   - Breast biopsy 2010 with atypical cells elected not to pursue tamoxifen treatment.   - Class III obesity - declined medical management, has been fluctuating with the same 10 pounds.   - Prediabetes  working on lifestyle modifications- most recent A1C 6.1%   - Sciatica and intermittent stiffness in knees and now daily right hip pain - does flexibility exercises, no alarm symptoms. Has done regular physical therapy with some improvement in symptoms.   -  Cataracts surgery both eyes last August and one coming up in September.   -  insomnia - sleep maintenance-advised sleep hygiene magnesium and melatonin at last visit. She found some benefit with melatonin, goes to sleep quickly but doesn't stay asleep, able to fall back asleep. Does not know if she snores. She wakes up with dry mouth and mouth open when on her back, on her side she does sleep better but does not last long. Does not wake up gasping for air, no morning headaches, no severe fatigue throughout the day.   - Venous insufficiency - seen by vascular in the past recommended and uses compression stockings.     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 Has nieces. Moved in November.     Lifestyle - started at 381 now down to 300, previously , has a history of losing 100 pounds.   - Diet - has good and bad days, knows what to do in general overall has been adhering to healthy lifestyle.  - Exercises regularly -  youtube, line dances, walks, resistance with weights.   - Sleep  - no trouble falling asleep, wakes up a few times a night and then falls asleep again. Side sleeping results in numbness in fingers.  Does not know if she stops breathing or snoring. Not overly tired the next day, Gets 6 hours a night and feels refreshed. Mind is on overdrive.    Patient Care Team:  Katiana Santos DO as PCP - General (Internal Medicine)  Katiana Santos DO as PCP - [employer] PCP      Objective       Current Outpatient Medications:     chlorthalidone (Hygroton) 25 mg tablet, TAKE 1 TABLET BY MOUTH EVERY DAY, Disp: 90 tablet, Rfl: 1    ascorbic acid (Vitamin C) 1,000 mg tablet, Take 1 tablet (1,000 mg) by mouth once daily., Disp: , Rfl:     biotin 1 mg capsule, Take by mouth., Disp: , Rfl:     biotin 300 mcg tablet, Biotin 300 MCG Oral Tablet  Refills: 0     Active, Disp: , Rfl:     budesonide-formoteroL (Symbicort) 80-4.5 mcg/actuation inhaler, Inhale 2 puffs 2 times a day. Rinse mouth with water after use to reduce aftertaste and incidence of candidiasis. Do not swallow., Disp: 1 each, Rfl: 2    CALCIUM CITRATE ORAL, Take 1 tablet by mouth once daily., Disp: , Rfl:     cholecalciferol (Vitamin D-3) 50 mcg (2,000 unit) capsule, Take 1 capsule (50 mcg) by mouth once daily., Disp: , Rfl:     flaxseed oiL 1,000 mg capsule, TAKE AS DIRECTED., Disp: , Rfl:     moxifloxacin (Vigamox) 0.5 % ophthalmic solution, , Disp: , Rfl:     multivitamin (MULTIPLE VITAMINS ORAL), Take 1 tablet by mouth once daily., Disp: , Rfl:     prednisoLONE acetate (Pred-Forte) 1 % ophthalmic suspension, , Disp: , Rfl:     Prolensa 0.07 % drops, , Disp: , Rfl:     sod picosulf-mag  "ox-citric ac (Clenpiq) 10 mg-3.5 gram- 12 gram/160 mL solution, drink 1/2 beginning at 6pm night before the procedure and start drinking the 2nd 1/2 5 hours before procedure time, Disp: , Rfl:     TURMERIC ORAL, Take 1 capsule by mouth once daily., Disp: , Rfl:       /73   Pulse 61   Temp 36.1 °C (97 °F) (Temporal)   Ht 1.71 m (5' 7.32\")   Wt 136 kg (300 lb)   BMI 46.54 kg/m²       Physical Examination:   General: Awake, alert, appears stated age   Head/eyes/ears: NCAT, EOMI, PERRL, TM WNL, no cerumen  Throat: moist mucus membranes, no pharyngeal erythema  Neck: Supple, nontender, no lymphadenopathy, thyroid exam unremarkable   Breast exam: No palpable lumps, no discharge, no noted axillary lymphadenopathy. Chaperone offered and declined   Heart: RRR, no murmurs, rubs or gallops  Lungs: CTA bilaterally, no rhonchi rales or wheezes   Abdomen: Soft, NT/ND  Extremities: 1+ edema mild chronic skin changes, 2+ DP pulses   Skin: No concerning skin lesions on visualized skin   Neuro: AAO x 3, no FND, gait unremarkable    Assessment/Plan   Assessment & Plan  Class 3 obesity (Multi)  Adhering to healthy lifestyle, not interested in medical or surgical options for weight loss   Has had significant success in the past, gradual trend towards continued weight loss   Encouragement provided.       Primary insomnia  Longstanding sleep maintenance, STOP BANG questionnaire not strongly positive though would prefer to rule out potential pathology. Obtain sleep study.   Orders:    In-Center Sleep Study (Non-Sleep Provider Only); Future    Screening mammogram for breast cancer    Orders:    BI mammo bilateral screening tomosynthesis    Labile hypertension  On chlorthalidone 25mg, blood pressure readings well controlled today.  Orders:    CBC and Auto Differential; Future    Comprehensive Metabolic Panel; Future    Lipid Panel; Future    Albumin-Creatinine Ratio, Urine Random; Future    Mild intermittent asthma without " complication (HHS-HCC)  Rare use of symbicort prn triggered by known inhalants.        Numbness of toes  Seen by neurology thought likely secondary to radiculopathy, trial of PT, followup with neurology if fails to improve.   Orders:    Referral to Physical Therapy; Future    Prediabetes  Recheck A1C, continue lifestyle modifications.  Orders:    Hemoglobin A1C; Future    TSH with reflex to Free T4 if abnormal; Future    Vitamin D deficiency    Orders:    Vitamin D 25-Hydroxy,Total (for eval of Vitamin D levels); Future    Dyslipidemia  Extensively discussed, interested in further risk stratification after rechecking lipid profile    Orders:    Lipid Panel; Future    CT cardiac scoring wo IV contrast; Future    Lumbosacral radiculopathy at L5    Orders:    Referral to Physical Therapy; Future    Routine general medical examination at health care facility    Age appropriate screening performed   Healthy lifestyle reviewed.   Depression screen negative   No additional pertinent family history or toxic habits   Cancer screening:   - Colonoscopy 2/22 repeat 5 years  - Mammogram  6/23 due for repeat   - Pap smear plus HPV 10/23 with Dr. Salazar  - Skin cancer prevention strategies reviewed no concerns  Immunizations: Flu shot and COVID booster recommended when available.  RSV due, Tdap due.   UTD shingrix  Dental and visual examinations UTD   DEXA due encouraged to schedule  Discussed adequate Vitamin D intake      Followup 1 year

## 2024-09-03 NOTE — ASSESSMENT & PLAN NOTE
Recheck A1C, continue lifestyle modifications.  Orders:    Hemoglobin A1C; Future    TSH with reflex to Free T4 if abnormal; Future

## 2024-09-03 NOTE — ASSESSMENT & PLAN NOTE
On chlorthalidone 25mg, blood pressure readings well controlled today.  Orders:    CBC and Auto Differential; Future    Comprehensive Metabolic Panel; Future    Lipid Panel; Future    Albumin-Creatinine Ratio, Urine Random; Future

## 2024-09-03 NOTE — ASSESSMENT & PLAN NOTE
Seen by neurology thought likely secondary to radiculopathy, trial of PT, followup with neurology if fails to improve.   Orders:    Referral to Physical Therapy; Future

## 2024-09-17 ENCOUNTER — LAB (OUTPATIENT)
Dept: LAB | Facility: LAB | Age: 69
End: 2024-09-17
Payer: MEDICARE

## 2024-09-17 DIAGNOSIS — R09.89 LABILE HYPERTENSION: ICD-10-CM

## 2024-09-17 DIAGNOSIS — E78.5 DYSLIPIDEMIA: ICD-10-CM

## 2024-09-17 LAB
CHOLEST SERPL-MCNC: 184 MG/DL (ref 0–199)
CHOLESTEROL/HDL RATIO: 3.6
HDLC SERPL-MCNC: 50.5 MG/DL
LDLC SERPL CALC-MCNC: 119 MG/DL
NON HDL CHOLESTEROL: 134 MG/DL (ref 0–149)
TRIGL SERPL-MCNC: 75 MG/DL (ref 0–149)
VLDL: 15 MG/DL (ref 0–40)

## 2024-09-17 PROCEDURE — 80061 LIPID PANEL: CPT

## 2024-09-17 PROCEDURE — 36415 COLL VENOUS BLD VENIPUNCTURE: CPT

## 2024-10-08 ENCOUNTER — APPOINTMENT (OUTPATIENT)
Dept: RADIOLOGY | Facility: CLINIC | Age: 69
End: 2024-10-08
Payer: MEDICARE

## 2024-10-15 ENCOUNTER — HOSPITAL ENCOUNTER (OUTPATIENT)
Dept: RADIOLOGY | Facility: CLINIC | Age: 69
Discharge: HOME | End: 2024-10-15
Payer: MEDICARE

## 2024-10-15 VITALS — HEIGHT: 67 IN | WEIGHT: 293 LBS | BODY MASS INDEX: 45.99 KG/M2

## 2024-10-15 PROCEDURE — 77067 SCR MAMMO BI INCL CAD: CPT | Performed by: RADIOLOGY

## 2024-10-15 PROCEDURE — 77067 SCR MAMMO BI INCL CAD: CPT

## 2024-10-15 PROCEDURE — 77063 BREAST TOMOSYNTHESIS BI: CPT | Performed by: RADIOLOGY

## 2024-11-09 ENCOUNTER — OFFICE VISIT (OUTPATIENT)
Dept: URGENT CARE | Age: 69
End: 2024-11-09
Payer: MEDICARE

## 2024-11-09 ENCOUNTER — HOSPITAL ENCOUNTER (OUTPATIENT)
Dept: RADIOLOGY | Facility: CLINIC | Age: 69
Discharge: HOME | End: 2024-11-09
Payer: MEDICARE

## 2024-11-09 VITALS
TEMPERATURE: 98.4 F | DIASTOLIC BLOOD PRESSURE: 79 MMHG | WEIGHT: 293 LBS | BODY MASS INDEX: 46.38 KG/M2 | RESPIRATION RATE: 17 BRPM | OXYGEN SATURATION: 94 % | SYSTOLIC BLOOD PRESSURE: 141 MMHG | HEART RATE: 52 BPM

## 2024-11-09 DIAGNOSIS — M79.672 LEFT FOOT PAIN: Primary | ICD-10-CM

## 2024-11-09 DIAGNOSIS — M79.672 LEFT FOOT PAIN: ICD-10-CM

## 2024-11-09 DIAGNOSIS — M25.572 ACUTE LEFT ANKLE PAIN: ICD-10-CM

## 2024-11-09 PROCEDURE — 73610 X-RAY EXAM OF ANKLE: CPT | Mod: LEFT SIDE | Performed by: RADIOLOGY

## 2024-11-09 PROCEDURE — 73630 X-RAY EXAM OF FOOT: CPT | Mod: LT

## 2024-11-09 PROCEDURE — 73630 X-RAY EXAM OF FOOT: CPT | Mod: LEFT SIDE | Performed by: RADIOLOGY

## 2024-11-09 PROCEDURE — 73610 X-RAY EXAM OF ANKLE: CPT | Mod: LT

## 2024-11-09 ASSESSMENT — PAIN SCALES - GENERAL: PAINLEVEL_OUTOF10: 4

## 2024-11-09 ASSESSMENT — PATIENT HEALTH QUESTIONNAIRE - PHQ9
1. LITTLE INTEREST OR PLEASURE IN DOING THINGS: NOT AT ALL
2. FEELING DOWN, DEPRESSED OR HOPELESS: NOT AT ALL
SUM OF ALL RESPONSES TO PHQ9 QUESTIONS 1 AND 2: 0

## 2024-11-09 NOTE — PROGRESS NOTES
HPI:  Patient states that for the past 2 weeks she had pain on the inner side of her left ankle/foot that increases with certain movements. No hx/o injury. No numbness/weakness.  +pain with weight bearing.  No previous hx/o fx in the left foot/ankle.  Pt states that she noticed more swelling around her left ankle today, but had salty meal yesterday.  No numbness/weakness in the left foot/ankle.  No fever/chills.  No CP or SOB.  Pt staets that she has a walking boot at home that she bought on [employer], but it does not feel comfortable.        ROS:  No numbness/weakness in the left foot/ankle  No cp  No sob    PE:    A&O x3  NCAT  No conjunctival erythema  Normal respiratory effort  +swelling around left ankle medially and laterally w/o redness/warmth  +tndop over left medial foot proximal to the ankle  +tndop over left medial malleoli   +pain with left ankle inversion  No neurovascular deficit over left foot/ankle  No focal deficit  Judgement normal    Results:  Xray left foot/ankle: arthritic changes, no acute fx on initial read    A/P:   Left ankle swelling and pain  Left foot pain    We will call you with xray results if you need further treatment.  Ice.  Elevate.  Rest.  Wrap.  Tylenol as needed for pain.  Follow up with podiatrist for further evaluation.  Keep a diary of symptoms.  Go to the ER if starts getting worse.

## 2024-11-27 ENCOUNTER — APPOINTMENT (OUTPATIENT)
Dept: PHYSICAL THERAPY | Facility: CLINIC | Age: 69
End: 2024-11-27
Payer: MEDICARE

## 2025-01-23 ENCOUNTER — DOCUMENTATION (OUTPATIENT)
Dept: PHYSICAL THERAPY | Facility: CLINIC | Age: 70
End: 2025-01-23
Payer: MEDICARE

## 2025-01-23 NOTE — PROGRESS NOTES
Physical Therapy                 Therapy Communication Note    Patient Name: Latanya Wade  MRN: [id number]  Department:   Room: Room/bed info not found  Today's Date: 1/23/2025     Discipline: Physical Therapy          Missed Visit Reason:      Missed Time: No Show    Comment:

## 2025-02-25 ENCOUNTER — EVALUATION (OUTPATIENT)
Dept: PHYSICAL THERAPY | Facility: CLINIC | Age: 70
End: 2025-02-25
Payer: MEDICARE

## 2025-02-25 DIAGNOSIS — M54.50 LOW BACK PAIN: Primary | ICD-10-CM

## 2025-02-25 DIAGNOSIS — M54.17 LUMBOSACRAL RADICULOPATHY AT L5: ICD-10-CM

## 2025-02-25 DIAGNOSIS — R20.0 NUMBNESS OF TOES: ICD-10-CM

## 2025-02-25 PROCEDURE — 97162 PT EVAL MOD COMPLEX 30 MIN: CPT | Mod: GP

## 2025-02-25 ASSESSMENT — ENCOUNTER SYMPTOMS
LOSS OF SENSATION IN FEET: 0
OCCASIONAL FEELINGS OF UNSTEADINESS: 0
DEPRESSION: 0

## 2025-02-25 ASSESSMENT — PAIN SCALES - GENERAL: PAINLEVEL_OUTOF10: 3

## 2025-02-25 ASSESSMENT — PAIN - FUNCTIONAL ASSESSMENT: PAIN_FUNCTIONAL_ASSESSMENT: 0-10

## 2025-02-25 NOTE — PROGRESS NOTES
Physical Therapy    Physical Therapy Evaluation    Patient Name: Latanya Wade  MRN: [id number]  Today's Date: 2/25/2025    Time Entry:  Time Calculation  Start Time: 1300  Stop Time: 1345  Time Calculation (min): 45 min  PT Evaluation Time Entry  PT Evaluation (Moderate) Time Entry: 45                    Visit #1  Insurance; Medicare  Cert; 2/25/2025 - 5/22/2025  Problem List Items Addressed This Visit             ICD-10-CM       Musculoskeletal and Injuries    Low back pain - Primary M54.50    Relevant Orders    Follow Up In Physical Therapy       Neuro    Lumbosacral radiculopathy at L5 M54.17    Relevant Orders    Follow Up In Physical Therapy    Numbness of toes R20.0    Relevant Orders    Follow Up In Physical Therapy       Assessment  Patient presents with mild chronic pain across hips/lower back and constant tingling/numbness of toes lasting 2 years on right side and about 6 months on left side. 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 Patient enjoys line dancing 3-4 times per week. In addition to line dancing, patient exercises along [employer] exercises classes. Patient demo slow and slightly limping gait, with lack of functional ROM of bilateral knees, and increased right leg internal rotation in stance phase of gait. Arms assistance necessary to stand up and navigate stairs. Provisional classification is mild lumbar derangement syndrome mostly effecting right and lightly less left hip and thigh. Patient will benefit from PT in learning there ex for LE strengthening, gait correction, promotion of better ROM of lumbar spine, hips, and knees. Patient is motivated and agreeable with discussed PT POC.     Plan  Treatment/Interventions: Education/ Instruction, Manual therapy, Therapeutic activities, Therapeutic exercises  PT Plan: Skilled PT  Certification Period Start Date: 02/25/25  Certification Period End Date: 05/22/25  Rehab Potential: Good  Plan of Care Agreement:  "Patient    Current Problem  1. Low back pain  Follow Up In Physical Therapy      2. Numbness of toes  Referral to Physical Therapy    Follow Up In Physical Therapy      3. Lumbosacral radiculopathy at L5  Referral to Physical Therapy    Follow Up In Physical Therapy          Subjective   General:  General  Reason for Referral: PT eval and treat; lumbar radiculopathy  Referred By: Dr Katiana Osman  Past Medical History Relevant to Rehab: Hx of lumbar radiculopathy, L5 (numnbess of toes) (Right nip intermittent pain and numbness of right toes x 2 years and left toes in last 6 months)  General Comment: Not taking any medicine for right hip aches or toes numbness  Precautions: NA  Precautions  STEADI Fall Risk Score (The score of 4 or more indicates an increased risk of falling): 1  Vital Signs: NA     Pain:  Pain Assessment: 0-10  0-10 (Numeric) Pain Score: 3  Pain Type: Chronic pain  Pain Location:  (right hip/thigh and littel left hip)  Pain Radiating Towards: \"don't feel any radiating pain\"  Pain Descriptors:  (ache)  Pain Frequency: Intermittent  Pain Onset: Gradual  Clinical Progression: Gradually worsening (tingling/numbness of right foot toes and partially left toes)  Effect of Pain on Daily Activities: Any time I sit, when I get up and feel stiff across hip and lower back. Stiffness does not stop me from doing anything  Patient's Stated Pain Goal:  (I want to increase lower body strength in order to move better and have easier time on stairs and with mobility in general)  Pain Interventions:  (Over the counter Tylenol and Advil)  Response to Interventions: Decrease in pain  Home Living: lives alone house with stairs to enter and second floor     Prior Function Per Pt/Caregiver Report: independent, drives, retired as RN     Objective   Posture: good      Range of Motion: Lumbar spine  Flexion - mild loss  Extension - moderate to severe loss  Lateral flexion to left/right - moderate loss  Rotation to left/right - " min  loss                               Hips  Flexion - WNL  Extension - severe loss  Abductions - to 40 degrees     Strength: Bilateral Gluteals 4/5  Hip flexors 5-/5  Quads and Hamstrings 5-/5     Flexibility: bilateral hip Flexors tightness      Palpation: WNL     Special Tests: Negative left/right SLR  Negative slump test  Negative for pain in prone and on elbows (sustained lumbar extension)     Gait: slow gait with mild right limp (pain in right Gluteal area), and right femur medial rotation in stance  Gait speed = 0.83 meters/second  Balance: fair     Stairs: two feet to each step, assisted by rail     Outcome Measures:  LEFS = 40/80  Modified Oswestry =  0%  5 x sit/stand = 26 seconds ( arms required from standard chair)  OP EDUCATION:  Outpatient Education  Individual(s) Educated: Patient  Education Provided: Anatomy, Body Mechanics, Home Exercise Program, POC  Diagnosis and Precautions: lumbosacral OA with pain/radiculopathy  Risk and Benefits Discussed with Patient/Caregiver/Other: yes  Patient/Caregiver Demonstrated Understanding: yes  Plan of Care Discussed and Agreed Upon: yes  Patient Response to Education: Patient/Caregiver Verbalized Understanding of Information  Education Comment: HEP and POC configuration    Goals:  Active       PT Problem       PT Goal 1       Start:  02/25/25    Expected End:  05/22/25       Patient will be able to demonstrated and report improved functional ambulation with different daily tasks and/or recreational activities, as evident by LEFS increase more than 10 points          PT Goal 2       Start:  02/25/25    Expected End:  05/22/25       Patient will demonstrated improved strength of  bilateral LE, evident by MMT of 5/5, and functionally evident by first attempt sit/stand transfers without arm assist and 5 x sit/stand under 20 seconds           PT Goal 3       Start:  02/25/25    Expected End:  05/22/25       Patient will demonstrated improved ROM of bilateral knees ,  indicated by goniometric measure of 5-100 or better           Patient Stated Goal 1       Start:  02/25/25    Expected End:  05/22/25       Report ability to maintain exercises (HEP) independently and demonstrate ability to ambulate stairs reciprocally with safe assist of rail as well as produce better gait mechanics and gait speed of 1 meter/second of faster

## 2025-03-03 ENCOUNTER — TREATMENT (OUTPATIENT)
Dept: PHYSICAL THERAPY | Facility: CLINIC | Age: 70
End: 2025-03-03
Payer: MEDICARE

## 2025-03-03 DIAGNOSIS — M54.50 LOW BACK PAIN: ICD-10-CM

## 2025-03-03 DIAGNOSIS — R20.0 NUMBNESS OF TOES: ICD-10-CM

## 2025-03-03 DIAGNOSIS — M54.17 LUMBOSACRAL RADICULOPATHY AT L5: ICD-10-CM

## 2025-03-03 PROCEDURE — 97110 THERAPEUTIC EXERCISES: CPT | Mod: GP

## 2025-03-03 NOTE — PROGRESS NOTES
Physical Therapy    Physical Therapy Follow up    Patient Name: Latanya Wade  MRN: [id number]  Today's Date: 3/3/2025    Time Entry:  Time Calculation  Start Time: 1440  Stop Time: 1520  Time Calculation (min): 40 min     PT Therapeutic Procedures Time Entry  Therapeutic Exercise Time Entry: 40                 Visit #2  Insurance; Medicare  Cert; 2/25/2025 - 5/22/2025  Problem List Items Addressed This Visit             ICD-10-CM       Musculoskeletal and Injuries    Low back pain M54.50       Neuro    Lumbosacral radiculopathy at L5 M54.17    Numbness of toes R20.0       Assessment  Patient presents with mild chronic pain across hips/lower back and constant tingling/numbness of toes lasting 2 years on right side and about 6 months on left side. 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 Patient enjoys line dancing 3-4 times per week. In addition to line dancing, patient exercises along [employer] exercises classes. Patient demo slow and slightly limping gait, with lack of functional ROM of bilateral knees, and increased right leg internal rotation in stance phase of gait. Arms assistance necessary to stand up and navigate stairs. Provisional classification is mild lumbar derangement syndrome mostly effecting right and lightly less left hip and thigh. Patient will benefit from PT in learning there ex for LE strengthening, gait correction, promotion of better ROM of lumbar spine, hips, and knees. Patient is motivated and agreeable with discussed PT POC.   3/3/2025; Patient demo good understanding of there ex and gait corrections so far. No pain exacerbation in right buttocks    Plan; Patient education, there ex for ROM and strengthening, functional mobility training, gait training, manual therapy       Current Problem  1. Numbness of toes  Follow Up In Physical Therapy      2. Lumbosacral radiculopathy at L5  Follow Up In Physical Therapy      3. Low back pain  Follow Up In Physical  Therapy          Subjective   General:     Precautions: NA     Vital Signs: NA     Pain: slight right hip (buttock) ache 1-2/10     Home Living: lives alone house with stairs to enter and second floor     Prior Function Per Pt/Caregiver Report: independent, drives, retired as RN     Objective   Posture: good      Range of Motion: Lumbar spine  Flexion - mild loss  Extension - moderate to severe loss  Lateral flexion to left/right - moderate loss  Rotation to left/right - min  loss                               Hips  Flexion - WNL  Extension - severe loss  Abductions - to 40 degrees     Strength: Bilateral Gluteals 4/5  Hip flexors 5-/5  Quads and Hamstrings 5-/5     Flexibility: bilateral hip Flexors tightness      Palpation: WNL     Special Tests: Negative left/right SLR  Negative slump test  Negative for pain in prone and on elbows (sustained lumbar extension)     Gait: slow gait with mild right limp (pain in right Gluteal area), and right femur medial rotation in stance  Gait speed = 0.83 meters/second  Balance: fair     Stairs: two feet to each step, assisted by rail     Outcome Measures:  LEFS = 40/80  Modified Oswestry =  0%  5 x sit/stand = 26 seconds ( arms required from standard chair)  OP EDUCATION:  PT intervention;  Standing modified right hip flexor stretches  Standing hip adductors stretch  Standing right ITB stretches  Standing repeated lumbar extensions (leaning against raised bed)  Side stepping along raised bed  Gait corrections  Seated Hamstrings stretches    Goals:

## 2025-03-11 ENCOUNTER — TREATMENT (OUTPATIENT)
Dept: PHYSICAL THERAPY | Facility: CLINIC | Age: 70
End: 2025-03-11
Payer: MEDICARE

## 2025-03-11 DIAGNOSIS — M54.50 LOW BACK PAIN: ICD-10-CM

## 2025-03-11 DIAGNOSIS — R20.0 NUMBNESS OF TOES: ICD-10-CM

## 2025-03-11 DIAGNOSIS — M54.17 LUMBOSACRAL RADICULOPATHY AT L5: ICD-10-CM

## 2025-03-11 PROCEDURE — 97110 THERAPEUTIC EXERCISES: CPT | Mod: GP,CQ

## 2025-03-11 ASSESSMENT — PAIN SCALES - GENERAL: PAINLEVEL_OUTOF10: 2

## 2025-03-11 NOTE — PROGRESS NOTES
Physical Therapy    Physical Therapy Follow up    Patient Name: Latanya Wade  MRN: [id number]  Today's Date: 3/11/2025  Time Entry:  Time Calculation  Start Time: 0400  Stop Time: 0445  Time Calculation (min): 45 min     PT Therapeutic Procedures Time Entry  Therapeutic Exercise Time Entry: 45                 Visit #3  Insurance; Medicare  Cert; 2/25/2025 - 5/22/2025    Problem List Items Addressed This Visit             ICD-10-CM    Lumbosacral radiculopathy at L5 M54.17    Numbness of toes R20.0    Low back pain M54.50       Assessment  Patient presents with mild chronic pain across hips/lower back and constant tingling/numbness of toes lasting 2 years on right side and about 6 months on left side. 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 Patient enjoys line dancing 3-4 times per week. In addition to line dancing, patient exercises along [employer] exercises classes. Patient demo slow and slightly limping gait, with lack of functional ROM of bilateral knees, and increased right leg internal rotation in stance phase of gait. Arms assistance necessary to stand up and navigate stairs. Provisional classification is mild lumbar derangement syndrome mostly effecting right and lightly less left hip and thigh. Patient will benefit from PT in learning there ex for LE strengthening, gait correction, promotion of better ROM of lumbar spine, hips, and knees. Patient is motivated and agreeable with discussed PT POC.   3/3/2025; Patient demo good understanding of there ex and gait corrections so far. No pain exacerbation in right buttocks  3/11/25  Tolerance for therex is good. Demonstrates good carry over as minimal cueing was needed. Increase of right glute and hip adductor pain when attempting supine hip adductor stretch, stopped exercise. No other noted issues today. Patient enjoys theraball strap [facility] exercise. Felt a slight reduction of tightness.     Plan; Patient education, there ex for  ROM and strengthening, functional mobility training, gait training, manual therapy     Current Problem  1. Numbness of toes  Follow Up In Physical Therapy      2. Lumbosacral radiculopathy at L5  Follow Up In Physical Therapy      3. Low back pain  Follow Up In Physical Therapy        Subjective    Slight ache in the right buttock, 2/10  Worse day before yesterday, got better after my exercises  HEP completed daily    Precautions: NA     Vital Signs: NA     Pain: slight right hip (buttock) ache 1-2/10  0-10 (Numeric) Pain Score: 2    Home Living: lives alone house with stairs to enter and second floor     Prior Function Per Pt/Caregiver Report: independent, drives, retired as RN     Objective   Posture: good      Range of Motion: Lumbar spine  Flexion - mild loss  Extension - moderate to severe loss  Lateral flexion to left/right - moderate loss  Rotation to left/right - min  loss                               Hips  Flexion - WNL  Extension - severe loss  Abductions - to 40 degrees     Strength: Bilateral Gluteals 4/5  Hip flexors 5-/5  Quads and Hamstrings 5-/5     Flexibility: bilateral hip Flexors tightness      Palpation: WNL     Special Tests: Negative left/right SLR  Negative slump test  Negative for pain in prone and on elbows (sustained lumbar extension)     Gait: slow gait with mild right limp (pain in right Gluteal area), and right femur medial rotation in stance  Gait speed = 0.83 meters/second  Balance: fair     Stairs: two feet to each step, assisted by rail     Outcome Measures:  LEFS = 40/80  Modified Oswestry =  0%  5 x sit/stand = 26 seconds ( arms required from standard chair)  OP EDUCATION:  PT intervention;  Seated Hamstrings stretches 2 x 30 seconds  Seated lumbar extensions x 15  Standing repeated lumbar extensions (leaning against raised bed) x 15  Standing modified right hip flexor stretches x 20  Standing hip adductors stretch x 20  Standing right ITB stretches 2 x 30 seconds  Standing hip  abduction x 20  Supine LTR x 20  Theraball KTC with strap x 20  Theraball hip add/abduction x 20  2nd step lunges x 10 each leg    Goals:    Active       PT Problem       PT Goal 1       Start:  02/25/25    Expected End:  05/22/25       Patient will be able to demonstrated and report improved functional ambulation with different daily tasks and/or recreational activities, as evident by LEFS increase more than 10 points          PT Goal 2       Start:  02/25/25    Expected End:  05/22/25       Patient will demonstrated improved strength of  bilateral LE, evident by MMT of 5/5, and functionally evident by first attempt sit/stand transfers without arm assist and 5 x sit/stand under 20 seconds           PT Goal 3       Start:  02/25/25    Expected End:  05/22/25       Patient will demonstrated improved ROM of bilateral knees , indicated by goniometric measure of 5-100 or better           Patient Stated Goal 1       Start:  02/25/25    Expected End:  05/22/25       Report ability to maintain exercises (HEP) independently and demonstrate ability to ambulate stairs reciprocally with safe assist of rail as well as produce better gait mechanics and gait speed of 1 meter/second of faster

## 2025-04-03 ENCOUNTER — APPOINTMENT (OUTPATIENT)
Dept: PHYSICAL THERAPY | Facility: CLINIC | Age: 70
End: 2025-04-03
Payer: MEDICARE

## 2025-04-06 DIAGNOSIS — R09.89 LABILE HYPERTENSION: ICD-10-CM

## 2025-04-08 ENCOUNTER — TREATMENT (OUTPATIENT)
Dept: PHYSICAL THERAPY | Facility: CLINIC | Age: 70
End: 2025-04-08
Payer: MEDICARE

## 2025-04-08 DIAGNOSIS — R20.0 NUMBNESS OF TOES: ICD-10-CM

## 2025-04-08 DIAGNOSIS — M54.50 LOW BACK PAIN: ICD-10-CM

## 2025-04-08 DIAGNOSIS — M54.17 LUMBOSACRAL RADICULOPATHY AT L5: ICD-10-CM

## 2025-04-08 PROCEDURE — 97110 THERAPEUTIC EXERCISES: CPT | Mod: GP,CQ

## 2025-04-08 RX ORDER — CHLORTHALIDONE 25 MG/1
25 TABLET ORAL DAILY
Qty: 90 TABLET | Refills: 1 | Status: SHIPPED | OUTPATIENT
Start: 2025-04-08

## 2025-04-08 ASSESSMENT — PAIN - FUNCTIONAL ASSESSMENT: PAIN_FUNCTIONAL_ASSESSMENT: 0-10

## 2025-04-08 NOTE — PROGRESS NOTES
Physical Therapy    Physical Therapy Follow up    Patient Name: Latanya Wade  MRN: [id number]  Today's Date: 4/8/2025  Time Entry:  Time Calculation  Start Time: 1015  Stop Time: 1100  Time Calculation (min): 45 min     PT Therapeutic Procedures Time Entry  Therapeutic Exercise Time Entry: 45                 Visit #4  Insurance; Medicare  Cert; 2/25/2025 - 5/22/2025    Problem List Items Addressed This Visit             ICD-10-CM    Lumbosacral radiculopathy at L5 M54.17    Numbness of toes R20.0    Low back pain M54.50       Assessment  Patient presents with mild chronic pain across hips/lower back and constant tingling/numbness of toes lasting 2 years on right side and about 6 months on left side. 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 Patient enjoys line dancing 3-4 times per week. In addition to line dancing, patient exercises along [employer] exercises classes. Patient demo slow and slightly limping gait, with lack of functional ROM of bilateral knees, and increased right leg internal rotation in stance phase of gait. Arms assistance necessary to stand up and navigate stairs. Provisional classification is mild lumbar derangement syndrome mostly effecting right and lightly less left hip and thigh. Patient will benefit from PT in learning there ex for LE strengthening, gait correction, promotion of better ROM of lumbar spine, hips, and knees. Patient is motivated and agreeable with discussed PT POC.   3/3/2025; Patient demo good understanding of there ex and gait corrections so far. No pain exacerbation in right buttocks  3/11/25  Tolerance for therex is good. Demonstrates good carry over as minimal cueing was needed. Increase of right glute and hip adductor pain when attempting supine hip adductor stretch, stopped exercise. No other noted issues today. Patient enjoys theraball strap KT exercise. Felt a slight reduction of tightness.   4/8/25  Patient tolerates session well.  Slight increase of pressure/discomfort above right glute with seated extensions; flexions felt good today without painful increase, only stretching. Increased stretching to right hip, rotational movements with high stiffness.     Plan; Patient education, there ex for ROM and strengthening, functional mobility training, gait training, manual therapy     Current Problem  1. Numbness of toes  Follow Up In Physical Therapy      2. Lumbosacral radiculopathy at L5  Follow Up In Physical Therapy      3. Low back pain  Follow Up In Physical Therapy        Subjective    No pain to report, pulling/cramping sensation above right glute  I continue to have numbness in my toes, the numbness has been around for 4 years  HEP completed most days of the week  I always try and do some stretching in the morning    Precautions: NA     Vital Signs: NA     Pain: slight right hip (buttock) ache 1-2/10  Pain Assessment: 0-10    Home Living: lives alone house with stairs to enter and second floor     Prior Function Per Pt/Caregiver Report: independent, drives, retired as RN     Objective   Posture: good      Range of Motion: Lumbar spine  Flexion - mild loss  Extension - moderate to severe loss  Lateral flexion to left/right - moderate loss  Rotation to left/right - min  loss                               Hips  Flexion - WNL  Extension - severe loss  Abductions - to 40 degrees     Strength: Bilateral Gluteals 4/5  Hip flexors 5-/5  Quads and Hamstrings 5-/5     Flexibility: bilateral hip Flexors tightness      Palpation: WNL     Special Tests: Negative left/right SLR  Negative slump test  Negative for pain in prone and on elbows (sustained lumbar extension)     Gait: slow gait with mild right limp (pain in right Gluteal area), and right femur medial rotation in stance  Gait speed = 0.83 meters/second  Balance: fair     Stairs: two feet to each step, assisted by rail     Outcome Measures:  LEFS = 40/80  Modified Oswestry =  0%  5 x sit/stand  = 26 seconds ( arms required from standard chair)  OP EDUCATION:    PT intervention;  Seated lumbar flexion on theraball x 15  Theraball KTC with strap x 20  Theraball bilateral knee to chest x 20  HL manual right hip IR/ER x 20 (high tightness)  Supine Hamstrings stretches 2 x 30 seconds  Supine LTR x 20  Lunges on 2nd step both legs (hip flexor stretching) x 20  Seated right hip IR/ER with self OP x 20  Standing hip adductors stretch x 20  Standing right ITB stretches 2 x 30 seconds    NT  Seated lumbar extensions x 15  Standing repeated lumbar extensions (leaning against raised bed) x 15  Standing modified right hip flexor stretches x 20    Goals:    Active       PT Problem       PT Goal 1       Start:  02/25/25    Expected End:  05/22/25       Patient will be able to demonstrated and report improved functional ambulation with different daily tasks and/or recreational activities, as evident by LEFS increase more than 10 points          PT Goal 2       Start:  02/25/25    Expected End:  05/22/25       Patient will demonstrated improved strength of  bilateral LE, evident by MMT of 5/5, and functionally evident by first attempt sit/stand transfers without arm assist and 5 x sit/stand under 20 seconds           PT Goal 3       Start:  02/25/25    Expected End:  05/22/25       Patient will demonstrated improved ROM of bilateral knees , indicated by goniometric measure of 5-100 or better           Patient Stated Goal 1       Start:  02/25/25    Expected End:  05/22/25       Report ability to maintain exercises (HEP) independently and demonstrate ability to ambulate stairs reciprocally with safe assist of rail as well as produce better gait mechanics and gait speed of 1 meter/second of faster

## 2025-04-15 ENCOUNTER — APPOINTMENT (OUTPATIENT)
Dept: PHYSICAL THERAPY | Facility: CLINIC | Age: 70
End: 2025-04-15
Payer: MEDICARE

## 2025-04-17 ENCOUNTER — HOSPITAL ENCOUNTER (OUTPATIENT)
Dept: RADIOLOGY | Facility: HOSPITAL | Age: 70
Discharge: HOME | End: 2025-04-17
Payer: MEDICARE

## 2025-04-17 DIAGNOSIS — E78.5 DYSLIPIDEMIA: ICD-10-CM

## 2025-04-17 PROCEDURE — 75571 CT HRT W/O DYE W/CA TEST: CPT

## 2025-04-22 ENCOUNTER — APPOINTMENT (OUTPATIENT)
Dept: PHYSICAL THERAPY | Facility: CLINIC | Age: 70
End: 2025-04-22
Payer: MEDICARE

## 2025-04-29 ENCOUNTER — TREATMENT (OUTPATIENT)
Dept: PHYSICAL THERAPY | Facility: CLINIC | Age: 70
End: 2025-04-29
Payer: MEDICARE

## 2025-04-29 DIAGNOSIS — M54.31 SCIATICA OF RIGHT SIDE: ICD-10-CM

## 2025-04-29 DIAGNOSIS — R20.0 NUMBNESS OF TOES: ICD-10-CM

## 2025-04-29 DIAGNOSIS — M54.17 LUMBOSACRAL RADICULOPATHY AT L5: ICD-10-CM

## 2025-04-29 DIAGNOSIS — M54.50 LOW BACK PAIN: Primary | ICD-10-CM

## 2025-04-29 PROCEDURE — 97110 THERAPEUTIC EXERCISES: CPT | Mod: GP

## 2025-04-29 NOTE — PROGRESS NOTES
Physical Therapy    Physical Therapy Follow up    Patient Name: aLtanya Wade  MRN: [id number]  Today's Date: 4/29/2025  Time Entry:  Time Calculation  Start Time: 1030  Stop Time: 1115  Time Calculation (min): 45 min     PT Therapeutic Procedures Time Entry  Therapeutic Exercise Time Entry: 45                 Visit #5  Insurance; Medicare  Cert; 2/25/2025 - 5/22/2025    Problem List Items Addressed This Visit           ICD-10-CM       Musculoskeletal and Injuries    Sciatica of right side M54.31    Low back pain - Primary M54.50       Neuro    Lumbosacral radiculopathy at L5 M54.17    Numbness of toes R20.0       Assessment  Patient presents with mild chronic pain across hips/lower back and constant tingling/numbness of toes lasting 2 years on right side and about 6 months on left side. 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 Patient enjoys line dancing 3-4 times per week. In addition to line dancing, patient exercises along [employer] exercises classes. Patient demo slow and slightly limping gait, with lack of functional ROM of bilateral knees, and increased right leg internal rotation in stance phase of gait. Arms assistance necessary to stand up and navigate stairs. Provisional classification is mild lumbar derangement syndrome mostly effecting right and lightly less left hip and thigh. Patient will benefit from PT in learning there ex for LE strengthening, gait correction, promotion of better ROM of lumbar spine, hips, and knees. Patient is motivated and agreeable with discussed PT POC.   3/3/2025; Patient demo good understanding of there ex and gait corrections so far. No pain exacerbation in right buttocks  3/11/25  Tolerance for therex is good. Demonstrates good carry over as minimal cueing was needed. Increase of right glute and hip adductor pain when attempting supine hip adductor stretch, stopped exercise. No other noted issues today. Patient enjoys theraball strap [facility]  exercise. Felt a slight reduction of tightness.   4/8/25  Patient tolerates session well. Slight increase of pressure/discomfort above right glute with seated extensions; flexions felt good today without painful increase, only stretching. Increased stretching to right hip, rotational movements with high stiffness.     Plan; Patient education, there ex for ROM and strengthening, functional mobility training, gait training, manual therapy     Current Problem  1. Low back pain  Follow Up In Physical Therapy      2. Numbness of toes  Follow Up In Physical Therapy      3. Sciatica of right side        4. Lumbosacral radiculopathy at L5  Follow Up In Physical Therapy        Subjective   I am not in lots of pain. Its mild when its there. Its never more than 3/10. Its functional movements that's limiting more than pain. I still manage to do everything. Last 6 weeks, I feel pulling sensation right at top of my butt on both sides. Stretching relieves it. Squatting seems to better than bending, when I do have pain. Working on getting extra weight off.     Precautions: NA     Vital Signs: NA     Pain: Right now no symptoms. In general I always feel better the more I move.        Home Living: lives alone house with stairs to enter and second floor     Prior Function Per Pt/Caregiver Report: independent, drives, retired as RN     Objective   Posture: good      Range of Motion: Lumbar spine  Flexion - mild loss  Extension - moderate to severe loss  Lateral flexion to left/right - moderate loss  Rotation to left/right - min  loss                               Hips  Flexion - WNL  Extension - severe loss  Abductions - to 40 degrees     Strength: Bilateral Gluteals 4/5  Hip flexors 5-/5  Quads and Hamstrings 5-/5     Flexibility: bilateral hip Flexors tightness      Palpation: WNL     Special Tests: Negative left/right SLR  Negative slump test  Negative for pain in prone and on elbows (sustained lumbar extension)     Gait: slow gait  with mild right limp (pain in right Gluteal area), and right femur medial rotation in stance  Gait speed = 0.83 meters/second  Balance: fair     Stairs: two feet to each step, assisted by rail     Outcome Measures:  LEFS = 40/80  Modified Oswestry =  0%  5 x sit/stand = 26 seconds ( arms required from standard chair)  OP EDUCATION:    PT intervention;  Seated repeated lumbar flexion (RFIS)  Standing repeated lumbar extension (BREANNE)  Hook lying lower rotation  Supine posterior pelvic tilts with 5 seconds holds  Hamstrings stretches with strap; 30 seconds x 2 each  Review of bridging exercises   Review of standing hip abductors stretches, ITB stretches, and hip flexors stretches       Goals:    Active       PT Problem       PT Goal 1       Start:  02/25/25    Expected End:  05/22/25       Patient will be able to demonstrated and report improved functional ambulation with different daily tasks and/or recreational activities, as evident by LEFS increase more than 10 points          PT Goal 2       Start:  02/25/25    Expected End:  05/22/25       Patient will demonstrated improved strength of  bilateral LE, evident by MMT of 5/5, and functionally evident by first attempt sit/stand transfers without arm assist and 5 x sit/stand under 20 seconds           PT Goal 3       Start:  02/25/25    Expected End:  05/22/25       Patient will demonstrated improved ROM of bilateral knees , indicated by goniometric measure of 5-100 or better           Patient Stated Goal 1       Start:  02/25/25    Expected End:  05/22/25       Report ability to maintain exercises (HEP) independently and demonstrate ability to ambulate stairs reciprocally with safe assist of rail as well as produce better gait mechanics and gait speed of 1 meter/second of faster

## 2025-05-06 ENCOUNTER — APPOINTMENT (OUTPATIENT)
Dept: PHYSICAL THERAPY | Facility: CLINIC | Age: 70
End: 2025-05-06
Payer: MEDICARE

## 2025-05-06 DIAGNOSIS — R20.0 NUMBNESS OF TOES: ICD-10-CM

## 2025-05-06 DIAGNOSIS — M54.50 LOW BACK PAIN: ICD-10-CM

## 2025-05-06 DIAGNOSIS — M54.17 LUMBOSACRAL RADICULOPATHY AT L5: ICD-10-CM

## 2025-05-06 PROCEDURE — 97110 THERAPEUTIC EXERCISES: CPT | Mod: GP

## 2025-05-06 NOTE — PROGRESS NOTES
Physical Therapy    Physical Therapy Follow up    Patient Name: Latanya Wade  MRN: [id number]  Today's Date: 5/6/2025  Time Entry:  Time Calculation  Start Time: 1510  Stop Time: 1555  Time Calculation (min): 45 min     PT Therapeutic Procedures Time Entry  Therapeutic Exercise Time Entry: 45                 Visit #6  Insurance; Medicare  Cert; 2/25/2025 - 5/22/2025    Problem List Items Addressed This Visit           ICD-10-CM       Musculoskeletal and Injuries    Low back pain M54.50       Neuro    Lumbosacral radiculopathy at L5 M54.17    Numbness of toes R20.0       Assessment  Patient presents with mild chronic pain across hips/lower back and constant tingling/numbness of toes lasting 2 years on right side and about 6 months on left side. 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 Patient enjoys line dancing 3-4 times per week. In addition to line dancing, patient exercises along [employer] exercises classes. Patient demo slow and slightly limping gait, with lack of functional ROM of bilateral knees, and increased right leg internal rotation in stance phase of gait. Arms assistance necessary to stand up and navigate stairs. Provisional classification is mild lumbar derangement syndrome mostly effecting right and lightly less left hip and thigh. Patient will benefit from PT in learning there ex for LE strengthening, gait correction, promotion of better ROM of lumbar spine, hips, and knees. Patient is motivated and agreeable with discussed PT POC.   3/3/2025; Patient demo good understanding of there ex and gait corrections so far. No pain exacerbation in right buttocks  3/11/25  Tolerance for therex is good. Demonstrates good carry over as minimal cueing was needed. Increase of right glute and hip adductor pain when attempting supine hip adductor stretch, stopped exercise. No other noted issues today. Patient enjoys theraball strap [facility] exercise. Felt a slight reduction of  tightness.   4/8/25  Patient tolerates session well. Slight increase of pressure/discomfort above right glute with seated extensions; flexions felt good today without painful increase, only stretching. Increased stretching to right hip, rotational movements with high stiffness.     Plan; Patient education, there ex for ROM and strengthening, functional mobility training, gait training, manual therapy     Current Problem  1. Numbness of toes  Follow Up In Physical Therapy      2. Lumbosacral radiculopathy at L5  Follow Up In Physical Therapy      3. Low back pain  Follow Up In Physical Therapy        Subjective   Aches present in right buttock. Normally exercises relieve it when I do them. Need to exercise more often and join aqua exercises     Precautions: NA     Vital Signs: NA     Pain: 3/10 - Right Buttock      Home Living: lives alone house with stairs to enter and second floor     Prior Function Per Pt/Caregiver Report: independent, drives, retired as RN     Objective   Posture: good      Range of Motion: Lumbar spine  Flexion - mild loss  Extension - moderate to severe loss  Lateral flexion to left/right - moderate loss  Rotation to left/right - min  loss                               Hips  Flexion - WNL  Extension - severe loss  Abductions - to 40 degrees     Strength: Bilateral Gluteals 4/5  Hip flexors 5-/5  Quads and Hamstrings 5-/5     Flexibility: bilateral hip Flexors tightness      Palpation: WNL     Special Tests: Negative left/right SLR  Negative slump test  Negative for pain in prone and on elbows (sustained lumbar extension)     Gait: slow gait with mild right limp (pain in right Gluteal area), and right femur medial rotation in stance  Gait speed = 0.83 meters/second  Balance: fair     Stairs: two feet to each step, assisted by rail     Outcome Measures:  LEFS = 40/80  Modified Oswestry =  0%  5 x sit/stand = 26 seconds ( arms required from standard chair)  OP EDUCATION:    PT  intervention;  Seated repeated lumbar flexion (RFIS)  Standing repeated lumbar extension (BREANNE)  Standing hip glides from right to left  Hook lying lower rotation  Supine posterior pelvic tilts with 5 seconds holds  Dead bugs  Bridge  Sit/stand  Hamstrings stretches with strap; 30 seconds x 2 each  Review of bridging exercises   Review of standing hip abductors stretches, ITB stretches, and hip flexors stretches       Goals:    Active       PT Problem       PT Goal 1       Start:  02/25/25    Expected End:  05/22/25       Patient will be able to demonstrated and report improved functional ambulation with different daily tasks and/or recreational activities, as evident by LEFS increase more than 10 points          PT Goal 2       Start:  02/25/25    Expected End:  05/22/25       Patient will demonstrated improved strength of  bilateral LE, evident by MMT of 5/5, and functionally evident by first attempt sit/stand transfers without arm assist and 5 x sit/stand under 20 seconds           PT Goal 3       Start:  02/25/25    Expected End:  05/22/25       Patient will demonstrated improved ROM of bilateral knees , indicated by goniometric measure of 5-100 or better           Patient Stated Goal 1       Start:  02/25/25    Expected End:  05/22/25       Report ability to maintain exercises (HEP) independently and demonstrate ability to ambulate stairs reciprocally with safe assist of rail as well as produce better gait mechanics and gait speed of 1 meter/second of faster

## 2025-07-17 ENCOUNTER — DOCUMENTATION (OUTPATIENT)
Dept: PHYSICAL THERAPY | Facility: CLINIC | Age: 70
End: 2025-07-17
Payer: MEDICARE

## 2025-07-17 NOTE — PROGRESS NOTES
Physical Therapy    Discharge Summary    Name: Latanya Wade  MRN: [id number]  : 1955  Date: 25    Discharge Summary: PT    Discharge Information: Date of discharge 2025, Date of last visit 2025, Date of evaluation 2025, Number of attended visits 6, Referred by DR Katiana Monroy, DO, and Referred for Back pain and difficulty with walking    Therapy Summary: Patient instructed in exercises to reduce back pain, Gluteal pain, strengthen legs for improved functional mobility, and maintenance of HEP    Discharge Status: NA     Rehab Discharge Reason: Patient not seen nor scheduled since 2025. Medicare cert has . Patient is now DC due to chart inactivity

## 2025-07-23 ENCOUNTER — TELEPHONE (OUTPATIENT)
Dept: PRIMARY CARE | Facility: CLINIC | Age: 70
End: 2025-07-23
Payer: MEDICARE

## 2025-07-23 NOTE — TELEPHONE ENCOUNTER
Pt states she is having sciatica pain and would like something to help with this she would like to discuss this with you.

## 2025-07-30 ENCOUNTER — OFFICE VISIT (OUTPATIENT)
Dept: PRIMARY CARE | Facility: CLINIC | Age: 70
End: 2025-07-30
Payer: MEDICARE

## 2025-07-30 VITALS
HEART RATE: 56 BPM | SYSTOLIC BLOOD PRESSURE: 124 MMHG | DIASTOLIC BLOOD PRESSURE: 59 MMHG | OXYGEN SATURATION: 98 % | WEIGHT: 289 LBS | BODY MASS INDEX: 44.83 KG/M2

## 2025-07-30 DIAGNOSIS — M53.3 SACROILIAC JOINT DYSFUNCTION: ICD-10-CM

## 2025-07-30 DIAGNOSIS — G47.9 SLEEP DISORDER: ICD-10-CM

## 2025-07-30 DIAGNOSIS — E66.813 CLASS 3 SEVERE OBESITY WITH SERIOUS COMORBIDITY AND BODY MASS INDEX (BMI) OF 40.0 TO 44.9 IN ADULT, UNSPECIFIED OBESITY TYPE: ICD-10-CM

## 2025-07-30 DIAGNOSIS — Z12.31 SCREENING MAMMOGRAM FOR BREAST CANCER: ICD-10-CM

## 2025-07-30 DIAGNOSIS — G47.30 SLEEP DISORDER BREATHING: Primary | ICD-10-CM

## 2025-07-30 DIAGNOSIS — R09.89 LABILE HYPERTENSION: ICD-10-CM

## 2025-07-30 PROBLEM — E66.9 OBESITY WITH SERIOUS COMORBIDITY: Status: ACTIVE | Noted: 2025-07-30

## 2025-07-30 PROCEDURE — 1125F AMNT PAIN NOTED PAIN PRSNT: CPT | Performed by: INTERNAL MEDICINE

## 2025-07-30 PROCEDURE — 3078F DIAST BP <80 MM HG: CPT | Performed by: INTERNAL MEDICINE

## 2025-07-30 PROCEDURE — 1159F MED LIST DOCD IN RCRD: CPT | Performed by: INTERNAL MEDICINE

## 2025-07-30 PROCEDURE — 99214 OFFICE O/P EST MOD 30 MIN: CPT | Performed by: INTERNAL MEDICINE

## 2025-07-30 PROCEDURE — G2211 COMPLEX E/M VISIT ADD ON: HCPCS | Performed by: INTERNAL MEDICINE

## 2025-07-30 PROCEDURE — 3074F SYST BP LT 130 MM HG: CPT | Performed by: INTERNAL MEDICINE

## 2025-07-30 RX ORDER — MELOXICAM 15 MG/1
15 TABLET ORAL DAILY
Qty: 30 TABLET | Refills: 0 | Status: SHIPPED | OUTPATIENT
Start: 2025-07-30 | End: 2025-07-31 | Stop reason: SDUPTHER

## 2025-07-30 ASSESSMENT — PAIN SCALES - GENERAL: PAINLEVEL_OUTOF10: 5

## 2025-07-30 NOTE — PROGRESS NOTES
Subjective   Patient ID: Latanya Wade is a 70 y.o. female who presents for Back Pain and Tailbone Pain (/).  History of Present Illness  Latanya Wade is a 70 year old female who presents with tailbone and right buttock pain, last seen in September for annual wellness visit    9/24: labs fine, prediabetes unchanged.   Lipids slightly higher   10/24; mammo good     She experiences cramp-like pain across the tailbone area for three to four months, with increased consistency over the last month. The pain radiates down the right buttock. There was no specific injury at the onset, but she had a fall a month ago without new injuries.    Ibuprofen at doses of 800 mg and 600 mg provides temporary relief, especially after dental surgery. Stretching exercises for her hips and glutes have not changed the pain.    There is no weakness in her legs, urinary incontinence, retention, or fecal incontinence. No numbness in the peroneal region, fevers, or chills.    She has lost weight from 300 pounds to 289 pounds, attributing this to working out and eating better. She has a history of numbness in her foot, which was not resolved with previous physical therapy.    PMHx:  - Right toe numbness   Of uncertain etiology has had EMG performed followed by Dr. Ashraf remotely believed to be related to lumbar radiculopathy no significant response to PT.   - HTN - on chlorthalidone 25mg   - Asthma - mild intermittent -  triggered by an allergen such as dust. No nighttime symptoms no hospitalizations, given Symbicort as needed  - Breast biopsy 2010 with atypical cells elected not to pursue tamoxifen treatment.   - Class 3 obesity - declined medical management, has been fluctuating with the same 10 pounds.   - Prediabetes -   working on lifestyle modifications- most recent A1C 6.1%   - Sciatica and intermittent stiffness in knees and now daily right hip pain - does flexibility exercises, no alarm symptoms. Has done regular physical therapy  with some improvement in symptoms.   -  Cataracts surgery both eyes last August and one coming up in September.   -  Insomnia - sleep maintenance-advised sleep hygiene magnesium and melatonin at last visit. She found some benefit with melatonin, goes to sleep quickly but doesn't stay asleep, able to fall back asleep. Does not know if she snores. She wakes up with dry mouth and mouth open when on her back, on her side she does sleep better but does not last long. Does not wake up gasping for air, no morning headaches, no severe fatigue throughout the day.  Ordered for sleep study at last visit unable to schedule   - Venous insufficiency - seen by vascular in the past recommended and uses compression stockings.  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 Has nieces. Moved in November.    PMHx, FHx, Social Hx, Surg Hx personally reviewed at this appointment. No pertinent findings and/or changes from prior (if applicable).      Objective     /59   Pulse 56   Wt 131 kg (289 lb)   LMP  (LMP Unknown) Comment: over 10 years ago  SpO2 98%   BMI 44.83 kg/m²      Physical Exam    General: Alert and oriented, in no apparent distress   HEENT: No conjunctival erythema, no external facial lesions   Lungs: Breathing comfortably  Skin: No evidence of skin breakdown.  Neuro: AAO x 3, answering questions appropriately, no obvious cranial nerve deficits   MUSCULOSKELETAL: No tenderness on palpation of the spine. Hip non-tender on manipulation. SI joint tightness. SLR test negative bilaterally no weakness, no sensory deficits, gait within normal limits        Lab Results   Component Value Date    WBC 4.9 09/03/2024    HGB 12.9 09/03/2024    HCT 41.6 09/03/2024     09/03/2024    CHOL 184 09/17/2024    TRIG 75 09/17/2024    HDL 50.5 09/17/2024    ALT 9 09/03/2024    AST 18  09/03/2024     09/03/2024    K 3.8 09/03/2024    CL 99 09/03/2024    CREATININE 0.69 09/03/2024    BUN 23 09/03/2024    CO2 32 09/03/2024    TSH 0.61 09/03/2024    GLUF 92 10/21/2017    HGBA1C 6.1 (H) 09/03/2024         Current Outpatient Medications   Medication Instructions    ascorbic acid (Vitamin C) 1,000 mg tablet 1 tablet, oral, Daily    biotin 1 mg capsule oral    biotin 300 mcg tablet Biotin 300 MCG Oral Tablet   Refills: 0       Active    budesonide-formoteroL (Symbicort) 80-4.5 mcg/actuation inhaler 2 puffs, inhalation, 2 times daily RT, Rinse mouth with water after use to reduce aftertaste and incidence of candidiasis. Do not swallow.    CALCIUM CITRATE ORAL 1 tablet, oral, Daily    chlorthalidone (HYGROTON) 25 mg, oral, Daily    cholecalciferol (Vitamin D-3) 50 mcg (2,000 unit) capsule 1 capsule, oral, Daily    flaxseed oiL 1,000 mg capsule TAKE AS DIRECTED.    meloxicam (MOBIC) 15 mg, oral, Daily    multivitamin (MULTIPLE VITAMINS ORAL) 1 tablet, oral, Daily    sod picosulf-mag ox-citric ac (Clenpiq) 10 mg-3.5 gram- 12 gram/160 mL solution drink 1/2 beginning at 6pm night before the procedure and start drinking the 2nd 1/2 5 hours before procedure time      TURMERIC ORAL 1 capsule, oral, Daily        CT cardiac scoring wo IV contrast  Narrative: Interpreted By:  Ej Hills,   STUDY:  CT CARDIAC SCORING WO IV CONTRAST;  4/17/2025 12:46 pm      INDICATION:  Signs/Symptoms:Dyslipidemia.      ,E78.5 Hyperlipidemia, unspecified      COMPARISON:  None.      ACCESSION NUMBER(S):  [id number]      ORDERING CLINICIAN:  TOMMY GILBERT      TECHNIQUE:  Using prospective ECG gating, CT scan of the coronary arteries was  performed without intravenous contrast. Coronary calcium scoring  was  performed according to the method of Agatston.      FINDINGS:  The score and distribution of calcium in the coronary arteries is as  follows:      LM 0,  LAD 0,  LCx 0,  RCA 0,      Total   0      The visualized  mid/lower ascending thoracic aorta, is normal in size,  measuring 37 mm in diameter. The heart is normal in size. No  pericardial effusion is present.Main pulmonary artery is normal in  caliber.      There is no evidence of lymphadenopathy or mass within the visualized  mediastinum.The visualized esophagus is unremarkable.      The visualized segments of the lungsare normally expanded.      The visualized subdiaphragmatic structures demonstrate no remarkable  findings.          Impression: Coronary artery calcium score of 0 *.  *Coronary Artery Calcium Gated and Nongated Agatston score  Score                                   CHD Risk  0                                       Very low  1-99                                  Mildly increased  100-299                             Moderately increased  >300                                Moderate to severely increased      Janessa et al. JCCT 2016 ([email or web address])      BARRON 10-Year CHD Risk with Coronary Artery Calcification can be  calculated using link below  [email or web address]  Jewels medeiros al. JACC 2015 ([email or web address]/[email or web address])          MACRO:  None      Signed by: Ej Hills 4/22/2025 9:11 AM  Dictation workstation:   [id number]           Assessment & Plan  SI dysfunction with right buttock and tailbone pain  Unresponsive to home exercises, heat, or ice. Differential includes sciatica and sacroiliac joint tightness. . She prefers to avoid injections and prednisone due to side effects.  - Prescribed meloxicam 15 mg once daily. Avoid additional NSAIDs.  - Order x-ray if symptoms persist.  - Provide physical therapy referral.  - Recommend home exercises and PT referral  - Encourage continued strengthening exercises and stretching.    Possible obstructive sleep apnea  Daytime sleepiness and dry mouth suggestive of sleep apnea. In-lab sleep study unavailable.  -  Switch to home sleep study.    Primary hypertension  Controlled on chlorthalidone    Class III obesity  Weight reduced from 300 lbs to 289 lbs through diet and exercise, a 3.7% reduction.  - Continue weight loss efforts with diet and exercise.      Health Maintenance  Cancer screening:   - Colonoscopy 2/22 repeat 5 years  - Mammogram 10/24  - Pap smear plus HPV 10/23 with Dr. Marie DOOLEY due    Follow-up as scheduled in December for annual wellness visit.      Katiana Santos, DO       This medical note was created with the assistance of artificial intelligence (AI) for documentation purposes. The content has been reviewed and confirmed by the healthcare provider for accuracy and completeness. Patient consented to the use of audio recording and use of AI during their visit.

## 2025-07-30 NOTE — PATIENT INSTRUCTIONS
Latanya,   Stop by the first floor for your x-ray or call [phone] to have this scheduled.  For pain, start MELOXICAM once per day. Do not take any additional NSAIDs including ibuprofen   Physical therapy referral - try [facility]   Address: [address], [place] [address]  Phone: [phone]  Mammogram ordered in October   Home sleep study ordered

## 2025-07-31 ENCOUNTER — TELEPHONE (OUTPATIENT)
Dept: PRIMARY CARE | Facility: CLINIC | Age: 70
End: 2025-07-31

## 2025-07-31 DIAGNOSIS — M53.3 SACROILIAC JOINT DYSFUNCTION: ICD-10-CM

## 2025-07-31 RX ORDER — MELOXICAM 15 MG/1
15 TABLET ORAL DAILY
Qty: 30 TABLET | Refills: 0 | Status: SHIPPED | OUTPATIENT
Start: 2025-07-31

## 2025-08-18 ENCOUNTER — HOSPITAL ENCOUNTER (OUTPATIENT)
Dept: RADIOLOGY | Facility: CLINIC | Age: 70
Discharge: HOME | End: 2025-08-18
Payer: MEDICARE

## 2025-08-18 DIAGNOSIS — M53.3 SACROILIAC JOINT DYSFUNCTION: ICD-10-CM

## 2025-08-18 PROCEDURE — 72202 X-RAY EXAM SI JOINTS 3/> VWS: CPT | Performed by: RADIOLOGY

## 2025-08-18 PROCEDURE — 72202 X-RAY EXAM SI JOINTS 3/> VWS: CPT

## 2025-09-29 ENCOUNTER — APPOINTMENT (OUTPATIENT)
Facility: CLINIC | Age: 70
End: 2025-09-29
Payer: MEDICARE

## 2025-12-04 ENCOUNTER — APPOINTMENT (OUTPATIENT)
Dept: PRIMARY CARE | Facility: CLINIC | Age: 70
End: 2025-12-04
Payer: MEDICARE